# Patient Record
Sex: FEMALE | Race: WHITE
[De-identification: names, ages, dates, MRNs, and addresses within clinical notes are randomized per-mention and may not be internally consistent; named-entity substitution may affect disease eponyms.]

---

## 2019-01-21 ENCOUNTER — HOSPITAL ENCOUNTER (EMERGENCY)
Dept: HOSPITAL 94 - ER | Age: 54
Discharge: HOME | End: 2019-01-21
Payer: MEDICAID

## 2019-01-21 VITALS — DIASTOLIC BLOOD PRESSURE: 88 MMHG | SYSTOLIC BLOOD PRESSURE: 169 MMHG

## 2019-01-21 VITALS — HEIGHT: 68 IN | WEIGHT: 274.48 LBS | BODY MASS INDEX: 41.6 KG/M2

## 2019-01-21 DIAGNOSIS — K08.89: ICD-10-CM

## 2019-01-21 DIAGNOSIS — J44.9: ICD-10-CM

## 2019-01-21 DIAGNOSIS — F12.90: ICD-10-CM

## 2019-01-21 DIAGNOSIS — I20.8: Primary | ICD-10-CM

## 2019-01-21 DIAGNOSIS — Z88.0: ICD-10-CM

## 2019-01-21 DIAGNOSIS — Z79.82: ICD-10-CM

## 2019-01-21 DIAGNOSIS — Z98.890: ICD-10-CM

## 2019-01-21 DIAGNOSIS — E11.9: ICD-10-CM

## 2019-01-21 DIAGNOSIS — Z88.1: ICD-10-CM

## 2019-01-21 DIAGNOSIS — I10: ICD-10-CM

## 2019-01-21 DIAGNOSIS — F15.90: ICD-10-CM

## 2019-01-21 DIAGNOSIS — Z95.5: ICD-10-CM

## 2019-01-21 DIAGNOSIS — Z79.899: ICD-10-CM

## 2019-01-21 DIAGNOSIS — Z91.010: ICD-10-CM

## 2019-01-21 DIAGNOSIS — Z88.5: ICD-10-CM

## 2019-01-21 DIAGNOSIS — I25.2: ICD-10-CM

## 2019-01-21 LAB
ALBUMIN SERPL BCP-MCNC: 3.6 G/DL (ref 3.4–5)
ALBUMIN/GLOB SERPL: 0.9 {RATIO} (ref 1.1–1.5)
ALP SERPL-CCNC: 105 IU/L (ref 46–116)
ALT SERPL W P-5'-P-CCNC: 44 U/L (ref 12–78)
ANION GAP SERPL CALCULATED.3IONS-SCNC: 10 MMOL/L (ref 8–16)
APTT PPP: 27 SECONDS (ref 22–32)
AST SERPL W P-5'-P-CCNC: 29 U/L (ref 10–37)
BASOPHILS # BLD AUTO: 0 X10'3 (ref 0–0.2)
BASOPHILS NFR BLD AUTO: 0.3 % (ref 0–1)
BILIRUB SERPL-MCNC: 0.2 MG/DL (ref 0.1–1)
BUN SERPL-MCNC: 16 MG/DL (ref 7–18)
BUN/CREAT SERPL: 15.8 (ref 6.6–38)
CALCIUM SERPL-MCNC: 9.2 MG/DL (ref 8.5–10.1)
CHLORIDE SERPL-SCNC: 105 MMOL/L (ref 99–107)
CO2 SERPL-SCNC: 27.8 MMOL/L (ref 24–32)
CREAT SERPL-MCNC: 1.01 MG/DL (ref 0.4–0.9)
EOSINOPHIL # BLD AUTO: 0.3 X10'3 (ref 0–0.9)
EOSINOPHIL NFR BLD AUTO: 4 % (ref 0–6)
ERYTHROCYTE [DISTWIDTH] IN BLOOD BY AUTOMATED COUNT: 14.8 % (ref 11.5–14.5)
GFR SERPL CREATININE-BSD FRML MDRD: 57 ML/MIN
GLUCOSE SERPL-MCNC: 112 MG/DL (ref 70–104)
HCT VFR BLD AUTO: 42 % (ref 35–45)
HGB BLD-MCNC: 14 G/DL (ref 12–16)
INR PPP: 1 INR
LYMPHOCYTES # BLD AUTO: 2 X10'3 (ref 1.1–4.8)
LYMPHOCYTES NFR BLD AUTO: 27.5 % (ref 21–51)
MCH RBC QN AUTO: 28.8 PG (ref 27–31)
MCHC RBC AUTO-ENTMCNC: 33.3 % (ref 33–36.5)
MCV RBC AUTO: 86.4 FL (ref 78–98)
MONOCYTES # BLD AUTO: 0.5 X10'3 (ref 0–0.9)
MONOCYTES NFR BLD AUTO: 7.2 % (ref 2–12)
NEUTROPHILS # BLD AUTO: 4.5 X10'3 (ref 1.8–7.7)
NEUTROPHILS NFR BLD AUTO: 61 % (ref 42–75)
PLATELET # BLD AUTO: 200 X10'3 (ref 140–440)
PMV BLD AUTO: 8.8 FL (ref 7.4–10.4)
POTASSIUM SERPL-SCNC: 3.8 MMOL/L (ref 3.5–5.1)
PROT SERPL-MCNC: 7.7 G/DL (ref 6.4–8.2)
PROTHROMBIN TIME: 9.7 SECONDS (ref 9–12)
RBC # BLD AUTO: 4.86 X10'6 (ref 4.2–5.6)
SODIUM SERPL-SCNC: 143 MMOL/L (ref 135–145)
WBC # BLD AUTO: 7.3 X10'3 (ref 4.5–11)

## 2019-01-21 PROCEDURE — 85730 THROMBOPLASTIN TIME PARTIAL: CPT

## 2019-01-21 PROCEDURE — 80053 COMPREHEN METABOLIC PANEL: CPT

## 2019-01-21 PROCEDURE — 84484 ASSAY OF TROPONIN QUANT: CPT

## 2019-01-21 PROCEDURE — 96374 THER/PROPH/DIAG INJ IV PUSH: CPT

## 2019-01-21 PROCEDURE — 71045 X-RAY EXAM CHEST 1 VIEW: CPT

## 2019-01-21 PROCEDURE — 83880 ASSAY OF NATRIURETIC PEPTIDE: CPT

## 2019-01-21 PROCEDURE — 93005 ELECTROCARDIOGRAM TRACING: CPT

## 2019-01-21 PROCEDURE — 85610 PROTHROMBIN TIME: CPT

## 2019-01-21 PROCEDURE — 85025 COMPLETE CBC W/AUTO DIFF WBC: CPT

## 2019-01-21 PROCEDURE — 99284 EMERGENCY DEPT VISIT MOD MDM: CPT

## 2019-01-21 PROCEDURE — 36415 COLL VENOUS BLD VENIPUNCTURE: CPT

## 2019-02-23 ENCOUNTER — HOSPITAL ENCOUNTER (EMERGENCY)
Dept: HOSPITAL 94 - ER | Age: 54
Discharge: HOME | End: 2019-02-23
Payer: MEDICAID

## 2019-02-23 VITALS — DIASTOLIC BLOOD PRESSURE: 118 MMHG | SYSTOLIC BLOOD PRESSURE: 164 MMHG

## 2019-02-23 VITALS — WEIGHT: 271.17 LBS | HEIGHT: 68 IN | BODY MASS INDEX: 41.1 KG/M2

## 2019-02-23 DIAGNOSIS — K20.9: Primary | ICD-10-CM

## 2019-02-23 DIAGNOSIS — E11.9: ICD-10-CM

## 2019-02-23 DIAGNOSIS — Z88.0: ICD-10-CM

## 2019-02-23 DIAGNOSIS — Z95.1: ICD-10-CM

## 2019-02-23 DIAGNOSIS — Z88.6: ICD-10-CM

## 2019-02-23 DIAGNOSIS — Z91.010: ICD-10-CM

## 2019-02-23 DIAGNOSIS — Z88.1: ICD-10-CM

## 2019-02-23 DIAGNOSIS — J44.9: ICD-10-CM

## 2019-02-23 DIAGNOSIS — F12.90: ICD-10-CM

## 2019-02-23 DIAGNOSIS — I25.2: ICD-10-CM

## 2019-02-23 DIAGNOSIS — F15.90: ICD-10-CM

## 2019-02-23 DIAGNOSIS — Z88.8: ICD-10-CM

## 2019-02-23 DIAGNOSIS — I10: ICD-10-CM

## 2019-02-23 PROCEDURE — 93005 ELECTROCARDIOGRAM TRACING: CPT

## 2019-02-23 PROCEDURE — 71045 X-RAY EXAM CHEST 1 VIEW: CPT

## 2019-02-23 PROCEDURE — 99283 EMERGENCY DEPT VISIT LOW MDM: CPT

## 2019-04-14 ENCOUNTER — HOSPITAL ENCOUNTER (EMERGENCY)
Dept: HOSPITAL 94 - ER | Age: 54
Discharge: HOME | End: 2019-04-14
Payer: MEDICAID

## 2019-04-14 VITALS — WEIGHT: 220.46 LBS | BODY MASS INDEX: 35.43 KG/M2 | HEIGHT: 66 IN

## 2019-04-14 VITALS — SYSTOLIC BLOOD PRESSURE: 162 MMHG | DIASTOLIC BLOOD PRESSURE: 98 MMHG

## 2019-04-14 DIAGNOSIS — Z88.1: ICD-10-CM

## 2019-04-14 DIAGNOSIS — E11.9: ICD-10-CM
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
## 2019-04-14 DIAGNOSIS — S05.02XA: Primary | ICD-10-CM

## 2019-04-14 DIAGNOSIS — Z88.5: ICD-10-CM

## 2019-04-14 DIAGNOSIS — Z98.890: ICD-10-CM

## 2019-04-14 DIAGNOSIS — I25.2: ICD-10-CM

## 2019-04-14 DIAGNOSIS — J44.9: ICD-10-CM

## 2019-04-14 DIAGNOSIS — F12.90: ICD-10-CM

## 2019-04-14 DIAGNOSIS — F15.90: ICD-10-CM
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
## 2019-04-14 DIAGNOSIS — Z79.899: ICD-10-CM

## 2019-04-14 DIAGNOSIS — Z88.0: ICD-10-CM

## 2019-04-14 DIAGNOSIS — I10: ICD-10-CM

## 2019-04-14 DIAGNOSIS — Z91.018: ICD-10-CM
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
## 2019-04-14 DIAGNOSIS — Z91.010: ICD-10-CM

## 2019-04-14 DIAGNOSIS — Z98.61: ICD-10-CM

## 2019-04-14 PROCEDURE — 99283 EMERGENCY DEPT VISIT LOW MDM: CPT

## 2019-07-21 ENCOUNTER — HOSPITAL ENCOUNTER (EMERGENCY)
Dept: HOSPITAL 94 - ER | Age: 54
Discharge: HOME | End: 2019-07-21
Payer: MEDICAID

## 2019-07-21 VITALS — BODY MASS INDEX: 41.77 KG/M2 | HEIGHT: 68 IN | WEIGHT: 275.58 LBS

## 2019-07-21 VITALS — SYSTOLIC BLOOD PRESSURE: 180 MMHG | DIASTOLIC BLOOD PRESSURE: 75 MMHG

## 2019-07-21 DIAGNOSIS — I10: ICD-10-CM

## 2019-07-21 DIAGNOSIS — I25.2: ICD-10-CM

## 2019-07-21 DIAGNOSIS — Z88.1: ICD-10-CM

## 2019-07-21 DIAGNOSIS — Z79.899: ICD-10-CM

## 2019-07-21 DIAGNOSIS — J02.0: Primary | ICD-10-CM

## 2019-07-21 DIAGNOSIS — Z79.82: ICD-10-CM

## 2019-07-21 DIAGNOSIS — E11.9: ICD-10-CM

## 2019-07-21 DIAGNOSIS — Z98.890: ICD-10-CM

## 2019-07-21 DIAGNOSIS — Z86.73: ICD-10-CM

## 2019-07-21 DIAGNOSIS — Z88.0: ICD-10-CM

## 2019-07-21 DIAGNOSIS — R19.7: ICD-10-CM

## 2019-07-21 DIAGNOSIS — Z91.010: ICD-10-CM

## 2019-07-21 DIAGNOSIS — R07.89: ICD-10-CM

## 2019-07-21 DIAGNOSIS — Z98.61: ICD-10-CM

## 2019-07-21 DIAGNOSIS — F15.90: ICD-10-CM

## 2019-07-21 DIAGNOSIS — K00.7: ICD-10-CM

## 2019-07-21 DIAGNOSIS — Z88.5: ICD-10-CM

## 2019-07-21 DIAGNOSIS — B95.0: ICD-10-CM

## 2019-07-21 DIAGNOSIS — F12.90: ICD-10-CM

## 2019-07-21 DIAGNOSIS — Z91.018: ICD-10-CM

## 2019-07-21 DIAGNOSIS — J44.9: ICD-10-CM

## 2019-07-21 LAB
ALBUMIN SERPL BCP-MCNC: 3.4 G/DL (ref 3.4–5)
ALBUMIN/GLOB SERPL: 0.8 {RATIO} (ref 1.1–1.5)
ALP SERPL-CCNC: 109 IU/L (ref 46–116)
ALT SERPL W P-5'-P-CCNC: 37 U/L (ref 12–78)
AMPHETAMINES UR QL SCN: POSITIVE
ANION GAP SERPL CALCULATED.3IONS-SCNC: 11 MMOL/L (ref 8–16)
APTT PPP: 29 SECONDS (ref 22–32)
AST SERPL W P-5'-P-CCNC: 23 U/L (ref 10–37)
BACTERIA URNS QL MICRO: (no result) /HPF
BARBITURATES UR QL SCN: NEGATIVE
BASOPHILS # BLD AUTO: 0.1 X10'3 (ref 0–0.2)
BASOPHILS NFR BLD AUTO: 0.6 % (ref 0–1)
BENZODIAZ UR QL SCN: NEGATIVE
BILIRUB SERPL-MCNC: 0.5 MG/DL (ref 0.1–1)
BUN SERPL-MCNC: 18 MG/DL (ref 7–18)
BUN/CREAT SERPL: 19.8 (ref 6.6–38)
BZE UR QL SCN: NEGATIVE
CALCIUM SERPL-MCNC: 8.5 MG/DL (ref 8.5–10.1)
CANNABINOIDS UR QL SCN: NEGATIVE
CHLORIDE SERPL-SCNC: 101 MMOL/L (ref 99–107)
CLARITY UR: (no result)
CO2 SERPL-SCNC: 22.4 MMOL/L (ref 24–32)
COLOR UR: YELLOW
CREAT SERPL-MCNC: 0.91 MG/DL (ref 0.4–0.9)
DEPRECATED SQUAMOUS URNS QL MICRO: (no result) /LPF
EOSINOPHIL # BLD AUTO: 0.2 X10'3 (ref 0–0.9)
EOSINOPHIL NFR BLD AUTO: 1.4 % (ref 0–6)
ERYTHROCYTE [DISTWIDTH] IN BLOOD BY AUTOMATED COUNT: 14.8 % (ref 11.5–14.5)
GFR SERPL CREATININE-BSD FRML MDRD: 64 ML/MIN
GLUCOSE SERPL-MCNC: 125 MG/DL (ref 70–104)
GLUCOSE UR STRIP-MCNC: NEGATIVE MG/DL
HCT VFR BLD AUTO: 40.1 % (ref 35–45)
HGB BLD-MCNC: 13.4 G/DL (ref 12–16)
HGB UR QL STRIP: (no result)
KETONES UR STRIP-MCNC: NEGATIVE MG/DL
LEUKOCYTE ESTERASE UR QL STRIP: (no result)
LYMPHOCYTES # BLD AUTO: 2.1 X10'3 (ref 1.1–4.8)
LYMPHOCYTES NFR BLD AUTO: 16.2 % (ref 21–51)
MCH RBC QN AUTO: 29 PG (ref 27–31)
MCHC RBC AUTO-ENTMCNC: 33.5 G/DL (ref 33–36.5)
MCV RBC AUTO: 86.5 FL (ref 78–98)
METHADONE UR QL SCN: NEGATIVE
MONOCYTES # BLD AUTO: 0.9 X10'3 (ref 0–0.9)
MONOCYTES NFR BLD AUTO: 6.7 % (ref 2–12)
MUCOUS THREADS URNS QL MICRO: (no result) /LPF
NEUTROPHILS # BLD AUTO: 9.9 X10'3 (ref 1.8–7.7)
NEUTROPHILS NFR BLD AUTO: 75.1 % (ref 42–75)
NITRITE UR QL STRIP: NEGATIVE
OPIATES UR QL SCN: NEGATIVE
PCP UR QL SCN: NEGATIVE
PH UR STRIP: 6 [PH] (ref 4.8–8)
PLATELET # BLD AUTO: 192 X10'3 (ref 140–440)
PMV BLD AUTO: 8.8 FL (ref 7.4–10.4)
POTASSIUM SERPL-SCNC: 4 MMOL/L (ref 3.5–5.1)
PROT SERPL-MCNC: 7.7 G/DL (ref 6.4–8.2)
PROT UR QL STRIP: NEGATIVE MG/DL
RBC # BLD AUTO: 4.63 X10'6 (ref 4.2–5.6)
RBC #/AREA URNS HPF: (no result) /HPF (ref 0–2)
RENAL EPI CELLS URNS QL MICRO: (no result) /HPF
SODIUM SERPL-SCNC: 134 MMOL/L (ref 135–145)
SP GR UR STRIP: 1.01 (ref 1–1.03)
TRANS CELLS URNS QL MICRO: (no result) /HPF
URN COLLECT METHOD CLASS: (no result)
UROBILINOGEN UR STRIP-MCNC: 0.2 E.U/DL (ref 0.2–1)
WBC # BLD AUTO: 13.2 X10'3 (ref 4.5–11)
WBC CLUMPS #/AREA URNS HPF: (no result) /HPF

## 2019-07-21 PROCEDURE — 84484 ASSAY OF TROPONIN QUANT: CPT

## 2019-07-21 PROCEDURE — 99284 EMERGENCY DEPT VISIT MOD MDM: CPT

## 2019-07-21 PROCEDURE — 96361 HYDRATE IV INFUSION ADD-ON: CPT

## 2019-07-21 PROCEDURE — 87040 BLOOD CULTURE FOR BACTERIA: CPT

## 2019-07-21 PROCEDURE — 80053 COMPREHEN METABOLIC PANEL: CPT

## 2019-07-21 PROCEDURE — 87880 STREP A ASSAY W/OPTIC: CPT

## 2019-07-21 PROCEDURE — 85610 PROTHROMBIN TIME: CPT

## 2019-07-21 PROCEDURE — 71045 X-RAY EXAM CHEST 1 VIEW: CPT

## 2019-07-21 PROCEDURE — 80305 DRUG TEST PRSMV DIR OPT OBS: CPT

## 2019-07-21 PROCEDURE — 93005 ELECTROCARDIOGRAM TRACING: CPT

## 2019-07-21 PROCEDURE — 96367 TX/PROPH/DG ADDL SEQ IV INF: CPT

## 2019-07-21 PROCEDURE — 36415 COLL VENOUS BLD VENIPUNCTURE: CPT

## 2019-07-21 PROCEDURE — 81001 URINALYSIS AUTO W/SCOPE: CPT

## 2019-07-21 PROCEDURE — 84145 PROCALCITONIN (PCT): CPT

## 2019-07-21 PROCEDURE — 96365 THER/PROPH/DIAG IV INF INIT: CPT

## 2019-07-21 PROCEDURE — 85730 THROMBOPLASTIN TIME PARTIAL: CPT

## 2019-07-21 PROCEDURE — 87088 URINE BACTERIA CULTURE: CPT

## 2019-07-21 PROCEDURE — 85025 COMPLETE CBC W/AUTO DIFF WBC: CPT

## 2019-07-21 PROCEDURE — 83605 ASSAY OF LACTIC ACID: CPT

## 2019-11-02 ENCOUNTER — HOSPITAL ENCOUNTER (EMERGENCY)
Dept: HOSPITAL 94 - ER | Age: 54
Discharge: HOME | End: 2019-11-02
Payer: MEDICAID

## 2019-11-02 VITALS — DIASTOLIC BLOOD PRESSURE: 84 MMHG | SYSTOLIC BLOOD PRESSURE: 208 MMHG

## 2019-11-02 VITALS — BODY MASS INDEX: 34.75 KG/M2 | HEIGHT: 68 IN | WEIGHT: 229.28 LBS

## 2019-11-02 DIAGNOSIS — Z98.890: ICD-10-CM

## 2019-11-02 DIAGNOSIS — Z88.0: ICD-10-CM

## 2019-11-02 DIAGNOSIS — F12.90: ICD-10-CM

## 2019-11-02 DIAGNOSIS — Z86.73: ICD-10-CM

## 2019-11-02 DIAGNOSIS — I10: ICD-10-CM

## 2019-11-02 DIAGNOSIS — Z88.5: ICD-10-CM

## 2019-11-02 DIAGNOSIS — J44.9: ICD-10-CM

## 2019-11-02 DIAGNOSIS — R04.0: Primary | ICD-10-CM

## 2019-11-02 DIAGNOSIS — Z79.899: ICD-10-CM

## 2019-11-02 DIAGNOSIS — Z88.1: ICD-10-CM

## 2019-11-02 DIAGNOSIS — F15.90: ICD-10-CM

## 2019-11-02 DIAGNOSIS — I25.2: ICD-10-CM

## 2019-11-02 DIAGNOSIS — E11.9: ICD-10-CM

## 2019-11-02 DIAGNOSIS — Z91.018: ICD-10-CM

## 2019-11-02 DIAGNOSIS — Z95.818: ICD-10-CM

## 2019-11-02 PROCEDURE — 99284 EMERGENCY DEPT VISIT MOD MDM: CPT

## 2019-11-02 PROCEDURE — 96374 THER/PROPH/DIAG INJ IV PUSH: CPT

## 2019-12-30 ENCOUNTER — HOSPITAL ENCOUNTER (INPATIENT)
Dept: HOSPITAL 94 - ER | Age: 54
LOS: 1 days | Discharge: HOME | DRG: 113 | End: 2019-12-31
Attending: INTERNAL MEDICINE | Admitting: INTERNAL MEDICINE
Payer: MEDICAID

## 2019-12-30 VITALS — WEIGHT: 260.15 LBS | BODY MASS INDEX: 39.43 KG/M2 | HEIGHT: 68 IN

## 2019-12-30 VITALS — DIASTOLIC BLOOD PRESSURE: 88 MMHG | SYSTOLIC BLOOD PRESSURE: 148 MMHG

## 2019-12-30 DIAGNOSIS — Z91.018: ICD-10-CM

## 2019-12-30 DIAGNOSIS — F12.90: ICD-10-CM

## 2019-12-30 DIAGNOSIS — M54.5: ICD-10-CM

## 2019-12-30 DIAGNOSIS — Z88.5: ICD-10-CM

## 2019-12-30 DIAGNOSIS — Z91.010: ICD-10-CM

## 2019-12-30 DIAGNOSIS — Z88.8: ICD-10-CM

## 2019-12-30 DIAGNOSIS — J10.1: Primary | ICD-10-CM

## 2019-12-30 DIAGNOSIS — Z98.61: ICD-10-CM

## 2019-12-30 DIAGNOSIS — E11.9: ICD-10-CM

## 2019-12-30 DIAGNOSIS — I25.2: ICD-10-CM

## 2019-12-30 DIAGNOSIS — E66.01: ICD-10-CM

## 2019-12-30 DIAGNOSIS — G89.29: ICD-10-CM

## 2019-12-30 DIAGNOSIS — Z88.0: ICD-10-CM

## 2019-12-30 DIAGNOSIS — J44.1: ICD-10-CM

## 2019-12-30 DIAGNOSIS — Z86.73: ICD-10-CM

## 2019-12-30 DIAGNOSIS — I10: ICD-10-CM

## 2019-12-30 DIAGNOSIS — Z60.2: ICD-10-CM

## 2019-12-30 LAB
ALBUMIN SERPL BCP-MCNC: 3.7 G/DL (ref 3.4–5)
ALBUMIN/GLOB SERPL: 0.8 {RATIO} (ref 1.1–1.5)
ALP SERPL-CCNC: 106 IU/L (ref 46–116)
ALT SERPL W P-5'-P-CCNC: 65 U/L (ref 12–78)
ANION GAP SERPL CALCULATED.3IONS-SCNC: 10 MMOL/L (ref 8–16)
AST SERPL W P-5'-P-CCNC: 80 U/L (ref 10–37)
BACTERIA URNS QL MICRO: (no result) /HPF
BASOPHILS # BLD AUTO: 0 X10'3 (ref 0–0.2)
BASOPHILS NFR BLD AUTO: 0.4 % (ref 0–1)
BILIRUB SERPL-MCNC: 0.4 MG/DL (ref 0.1–1)
BUN SERPL-MCNC: 12 MG/DL (ref 7–18)
BUN/CREAT SERPL: 13.5 (ref 6.6–38)
CALCIUM SERPL-MCNC: 8.7 MG/DL (ref 8.5–10.1)
CHLORIDE SERPL-SCNC: 102 MMOL/L (ref 99–107)
CLARITY UR: (no result)
CO2 SERPL-SCNC: 26.3 MMOL/L (ref 24–32)
COLOR UR: (no result)
CREAT SERPL-MCNC: 0.89 MG/DL (ref 0.4–0.9)
DEPRECATED SQUAMOUS URNS QL MICRO: (no result) /LPF
EOSINOPHIL # BLD AUTO: 0.1 X10'3 (ref 0–0.9)
EOSINOPHIL NFR BLD AUTO: 0.7 % (ref 0–6)
ERYTHROCYTE [DISTWIDTH] IN BLOOD BY AUTOMATED COUNT: 14.6 % (ref 11.5–14.5)
GFR SERPL CREATININE-BSD FRML MDRD: 66 ML/MIN
GLUCOSE SERPL-MCNC: 131 MG/DL (ref 70–104)
GLUCOSE UR STRIP-MCNC: NEGATIVE MG/DL
HBA1C MFR BLD: 6.8 % (ref 4.5–6.2)
HCT VFR BLD AUTO: 41.4 % (ref 35–45)
HGB BLD-MCNC: 14 G/DL (ref 12–16)
HGB UR QL STRIP: (no result)
KETONES UR STRIP-MCNC: NEGATIVE MG/DL
LEUKOCYTE ESTERASE UR QL STRIP: NEGATIVE
LYMPHOCYTES # BLD AUTO: 0.3 X10'3 (ref 1.1–4.8)
LYMPHOCYTES NFR BLD AUTO: 4.3 % (ref 21–51)
MAGNESIUM SERPL-MCNC: 1.8 MG/DL (ref 1.5–2.4)
MCH RBC QN AUTO: 29.3 PG (ref 27–31)
MCHC RBC AUTO-ENTMCNC: 33.9 G/DL (ref 33–36.5)
MCV RBC AUTO: 86.3 FL (ref 78–98)
MONOCYTES # BLD AUTO: 0.8 X10'3 (ref 0–0.9)
MONOCYTES NFR BLD AUTO: 9.9 % (ref 2–12)
MUCOUS THREADS URNS QL MICRO: (no result) /LPF
NEUTROPHILS # BLD AUTO: 6.5 X10'3 (ref 1.8–7.7)
NEUTROPHILS NFR BLD AUTO: 84.7 % (ref 42–75)
NITRITE UR QL STRIP: NEGATIVE
PH UR STRIP: 6 [PH] (ref 4.8–8)
PLATELET # BLD AUTO: 189 X10'3 (ref 140–440)
PMV BLD AUTO: 8.7 FL (ref 7.4–10.4)
POTASSIUM SERPL-SCNC: 4 MMOL/L (ref 3.5–5.1)
PROT SERPL-MCNC: 8.6 G/DL (ref 6.4–8.2)
PROT UR QL STRIP: (no result) MG/DL
RBC # BLD AUTO: 4.8 X10'6 (ref 4.2–5.6)
RBC #/AREA URNS HPF: (no result) /HPF (ref 0–2)
SODIUM SERPL-SCNC: 138 MMOL/L (ref 135–145)
SP GR UR STRIP: 1.02 (ref 1–1.03)
TRANS CELLS URNS QL MICRO: (no result) /HPF
URN COLLECT METHOD CLASS: (no result)
UROBILINOGEN UR STRIP-MCNC: 0.2 E.U/DL (ref 0.2–1)
WBC # BLD AUTO: 7.7 X10'3 (ref 4.5–11)
WBC #/AREA URNS HPF: (no result) /HPF (ref 0–4)

## 2019-12-30 PROCEDURE — 83735 ASSAY OF MAGNESIUM: CPT

## 2019-12-30 PROCEDURE — 87503 INFLUENZA DNA AMP PROB ADDL: CPT

## 2019-12-30 PROCEDURE — 36415 COLL VENOUS BLD VENIPUNCTURE: CPT

## 2019-12-30 PROCEDURE — 80053 COMPREHEN METABOLIC PANEL: CPT

## 2019-12-30 PROCEDURE — 96374 THER/PROPH/DIAG INJ IV PUSH: CPT

## 2019-12-30 PROCEDURE — 84145 PROCALCITONIN (PCT): CPT

## 2019-12-30 PROCEDURE — 96375 TX/PRO/DX INJ NEW DRUG ADDON: CPT

## 2019-12-30 PROCEDURE — 85610 PROTHROMBIN TIME: CPT

## 2019-12-30 PROCEDURE — 87502 INFLUENZA DNA AMP PROBE: CPT

## 2019-12-30 PROCEDURE — 82948 REAGENT STRIP/BLOOD GLUCOSE: CPT

## 2019-12-30 PROCEDURE — 81001 URINALYSIS AUTO W/SCOPE: CPT

## 2019-12-30 PROCEDURE — 83605 ASSAY OF LACTIC ACID: CPT

## 2019-12-30 PROCEDURE — 94640 AIRWAY INHALATION TREATMENT: CPT

## 2019-12-30 PROCEDURE — 99285 EMERGENCY DEPT VISIT HI MDM: CPT

## 2019-12-30 PROCEDURE — 71045 X-RAY EXAM CHEST 1 VIEW: CPT

## 2019-12-30 PROCEDURE — 85025 COMPLETE CBC W/AUTO DIFF WBC: CPT

## 2019-12-30 PROCEDURE — 83036 HEMOGLOBIN GLYCOSYLATED A1C: CPT

## 2019-12-30 PROCEDURE — 80048 BASIC METABOLIC PNL TOTAL CA: CPT

## 2019-12-30 PROCEDURE — 87081 CULTURE SCREEN ONLY: CPT

## 2019-12-30 PROCEDURE — 87040 BLOOD CULTURE FOR BACTERIA: CPT

## 2019-12-30 RX ADMIN — SODIUM CHLORIDE SCH MLS/HR: 9 INJECTION INTRAMUSCULAR; INTRAVENOUS; SUBCUTANEOUS at 21:18

## 2019-12-30 RX ADMIN — INSULIN LISPRO SCH UNITS: 100 INJECTION, SOLUTION INTRAVENOUS; SUBCUTANEOUS at 22:31

## 2019-12-30 SDOH — SOCIAL STABILITY - SOCIAL INSECURITY: PROBLEMS RELATED TO LIVING ALONE: Z60.2

## 2019-12-30 NOTE — NUR
PT'S BLOOD SUGAR 305 PT STATES SHE HAS NEVER HAD INSULIN AND IS DIET CONTROLLED. MD AWARE OF 
BLOOD SUGARS  & 152 IN THE ER PRIOR TO OBTAINING THIS ONE.

## 2019-12-30 NOTE — NUR
pt medicated for blood sugar now of 271 via accucheck. received 12 of lantus and 1 unit of 
regular per ordered protocol.

## 2019-12-30 NOTE — NUR
pt transferred from [facility] into bed. vitals done nares swabbed and pt skin care done for inc 
of urine.

## 2019-12-30 NOTE — NUR
PT C/O ALL OVER ITCHING AFTER DECADRON WAS GIVEN, MD NOTIFIED, BENADRYL 
ORDERED. PT STATED ITCHING WAS GONE WHEN BENADRYL WAS AVAILABLE, REFUSED 
BENADRYL, STATED SHE DIDN'T NEED IT.

## 2019-12-30 NOTE — NUR
tray food ordered and given to pt stated she was hungry and had not eaten. made sure it was 
lower carbs.

## 2019-12-31 VITALS — DIASTOLIC BLOOD PRESSURE: 62 MMHG | SYSTOLIC BLOOD PRESSURE: 126 MMHG

## 2019-12-31 VITALS — SYSTOLIC BLOOD PRESSURE: 142 MMHG | DIASTOLIC BLOOD PRESSURE: 67 MMHG

## 2019-12-31 VITALS — SYSTOLIC BLOOD PRESSURE: 128 MMHG | DIASTOLIC BLOOD PRESSURE: 69 MMHG

## 2019-12-31 LAB
ALBUMIN SERPL BCP-MCNC: 3.1 G/DL (ref 3.4–5)
ANION GAP SERPL CALCULATED.3IONS-SCNC: 10 MMOL/L (ref 8–16)
BASOPHILS # BLD AUTO: 0 X10'3 (ref 0–0.2)
BASOPHILS NFR BLD AUTO: 0.6 % (ref 0–1)
BUN SERPL-MCNC: 18 MG/DL (ref 7–18)
BUN/CREAT SERPL: 20 (ref 6.6–38)
CALCIUM SERPL-MCNC: 8.5 MG/DL (ref 8.5–10.1)
CHLORIDE SERPL-SCNC: 107 MMOL/L (ref 99–107)
CO2 SERPL-SCNC: 23 MMOL/L (ref 24–32)
CREAT SERPL-MCNC: 0.9 MG/DL (ref 0.4–0.9)
EOSINOPHIL # BLD AUTO: 0 X10'3 (ref 0–0.9)
EOSINOPHIL NFR BLD AUTO: 0.1 % (ref 0–6)
ERYTHROCYTE [DISTWIDTH] IN BLOOD BY AUTOMATED COUNT: 15 % (ref 11.5–14.5)
GFR SERPL CREATININE-BSD FRML MDRD: 65 ML/MIN
GIANT PLATELETS BLD QL SMEAR: (no result)
GLUCOSE SERPL-MCNC: 170 MG/DL (ref 70–104)
HCT VFR BLD AUTO: 41.4 % (ref 35–45)
HGB BLD-MCNC: 13.8 G/DL (ref 12–16)
LYMPHOCYTES # BLD AUTO: 0.7 X10'3 (ref 1.1–4.8)
LYMPHOCYTES NFR BLD AUTO: 11.7 % (ref 21–51)
MAGNESIUM SERPL-MCNC: 1.9 MG/DL (ref 1.5–2.4)
MCH RBC QN AUTO: 29.1 PG (ref 27–31)
MCHC RBC AUTO-ENTMCNC: 33.3 G/DL (ref 33–36.5)
MCV RBC AUTO: 87.5 FL (ref 78–98)
MONOCYTES # BLD AUTO: 0.8 X10'3 (ref 0–0.9)
MONOCYTES NFR BLD AUTO: 13.9 % (ref 2–12)
NEUTROPHILS # BLD AUTO: 4.4 X10'3 (ref 1.8–7.7)
NEUTROPHILS NFR BLD AUTO: 73.7 % (ref 42–75)
PLATELET # BLD AUTO: 165 X10'3 (ref 140–440)
PLATELET BLD QL SMEAR: NORMAL
PMV BLD AUTO: 9.1 FL (ref 7.4–10.4)
POTASSIUM SERPL-SCNC: 4.1 MMOL/L (ref 3.5–5.1)
RBC # BLD AUTO: 4.73 X10'6 (ref 4.2–5.6)
SODIUM SERPL-SCNC: 140 MMOL/L (ref 135–145)
WBC # BLD AUTO: 6 X10'3 (ref 4.5–11)

## 2019-12-31 RX ADMIN — SODIUM CHLORIDE SCH MLS/HR: 9 INJECTION INTRAMUSCULAR; INTRAVENOUS; SUBCUTANEOUS at 13:29

## 2019-12-31 RX ADMIN — INSULIN LISPRO SCH UNITS: 100 INJECTION, SOLUTION INTRAVENOUS; SUBCUTANEOUS at 13:57

## 2019-12-31 RX ADMIN — INSULIN LISPRO SCH UNITS: 100 INJECTION, SOLUTION INTRAVENOUS; SUBCUTANEOUS at 09:27

## 2019-12-31 RX ADMIN — SODIUM CHLORIDE SCH MLS/HR: 9 INJECTION INTRAMUSCULAR; INTRAVENOUS; SUBCUTANEOUS at 03:29

## 2019-12-31 NOTE — NUR
Patient is alert, oriented, and appropriate for discharge. Patient verbalized understanding 
of all discharge instructions and symptom worsening. Patient IV and Tele removed. Patient 
has all belongings and left escorted by employee into personal vehicle. 

-------------------------------------------------------------------------------

Addendum: 12/31/19 at 1555 by Faith Weinberg RN

-------------------------------------------------------------------------------

Amended: Links added.

## 2019-12-31 NOTE — NUR
blood sugar checked bs 200. pt states she is feeling better.teaching done regarding her 
diabetes. whatv her a1c meant etc.

## 2019-12-31 NOTE — NUR
DM consult: Patient with hx T2DM with A1c 6.8 not warranting DM education however per 
consult pt requesting to speak with RD. Pt was discharged prior to RD being available for 
bedside visit. 


-------------------------------------------------------------------------------

Addendum: 12/31/19 at 1614 by Barbie Soni RD

-------------------------------------------------------------------------------

Amended: Links added.

## 2020-01-12 ENCOUNTER — HOSPITAL ENCOUNTER (EMERGENCY)
Dept: HOSPITAL 94 - ER | Age: 55
LOS: 1 days | Discharge: HOME | End: 2020-01-13
Payer: MEDICAID

## 2020-01-12 VITALS — BODY MASS INDEX: 39.76 KG/M2 | HEIGHT: 68 IN | WEIGHT: 262.35 LBS

## 2020-01-12 DIAGNOSIS — Z79.82: ICD-10-CM

## 2020-01-12 DIAGNOSIS — F15.90: ICD-10-CM

## 2020-01-12 DIAGNOSIS — Z88.8: ICD-10-CM

## 2020-01-12 DIAGNOSIS — F12.90: ICD-10-CM

## 2020-01-12 DIAGNOSIS — Z86.73: ICD-10-CM

## 2020-01-12 DIAGNOSIS — Z88.1: ICD-10-CM

## 2020-01-12 DIAGNOSIS — Z91.010: ICD-10-CM

## 2020-01-12 DIAGNOSIS — I10: ICD-10-CM

## 2020-01-12 DIAGNOSIS — Z79.2: ICD-10-CM

## 2020-01-12 DIAGNOSIS — Z79.899: ICD-10-CM

## 2020-01-12 DIAGNOSIS — Z88.5: ICD-10-CM

## 2020-01-12 DIAGNOSIS — J44.1: Primary | ICD-10-CM

## 2020-01-12 DIAGNOSIS — I25.2: ICD-10-CM

## 2020-01-12 DIAGNOSIS — Z98.890: ICD-10-CM

## 2020-01-12 DIAGNOSIS — Z95.5: ICD-10-CM

## 2020-01-12 DIAGNOSIS — E11.9: ICD-10-CM

## 2020-01-12 LAB
ALBUMIN SERPL BCP-MCNC: 3.5 G/DL (ref 3.4–5)
ALBUMIN/GLOB SERPL: 0.9 {RATIO} (ref 1.1–1.5)
ALP SERPL-CCNC: 95 IU/L (ref 46–116)
ALT SERPL W P-5'-P-CCNC: 52 U/L (ref 12–78)
ANION GAP SERPL CALCULATED.3IONS-SCNC: 10 MMOL/L (ref 8–16)
AST SERPL W P-5'-P-CCNC: 47 U/L (ref 10–37)
BASOPHILS # BLD AUTO: 0.1 X10'3 (ref 0–0.2)
BASOPHILS NFR BLD AUTO: 1.5 % (ref 0–1)
BILIRUB SERPL-MCNC: 0.3 MG/DL (ref 0.1–1)
BUN SERPL-MCNC: 15 MG/DL (ref 7–18)
BUN/CREAT SERPL: 14.9 (ref 6.6–38)
CALCIUM SERPL-MCNC: 8.7 MG/DL (ref 8.5–10.1)
CHLORIDE SERPL-SCNC: 109 MMOL/L (ref 99–107)
CO2 SERPL-SCNC: 24.9 MMOL/L (ref 24–32)
CREAT SERPL-MCNC: 1.01 MG/DL (ref 0.4–0.9)
EOSINOPHIL # BLD AUTO: 0.2 X10'3 (ref 0–0.9)
EOSINOPHIL NFR BLD AUTO: 2 % (ref 0–6)
ERYTHROCYTE [DISTWIDTH] IN BLOOD BY AUTOMATED COUNT: 14.8 % (ref 11.5–14.5)
GFR SERPL CREATININE-BSD FRML MDRD: 57 ML/MIN
GLUCOSE SERPL-MCNC: 97 MG/DL (ref 70–104)
HCT VFR BLD AUTO: 36.8 % (ref 35–45)
HGB BLD-MCNC: 12.3 G/DL (ref 12–16)
LYMPHOCYTES # BLD AUTO: 2.7 X10'3 (ref 1.1–4.8)
LYMPHOCYTES NFR BLD AUTO: 30.4 % (ref 21–51)
MCH RBC QN AUTO: 28.8 PG (ref 27–31)
MCHC RBC AUTO-ENTMCNC: 33.4 G/DL (ref 33–36.5)
MCV RBC AUTO: 86.4 FL (ref 78–98)
MONOCYTES # BLD AUTO: 0.8 X10'3 (ref 0–0.9)
MONOCYTES NFR BLD AUTO: 9 % (ref 2–12)
NEUTROPHILS # BLD AUTO: 5 X10'3 (ref 1.8–7.7)
NEUTROPHILS NFR BLD AUTO: 57.1 % (ref 42–75)
PLATELET # BLD AUTO: 211 X10'3 (ref 140–440)
PMV BLD AUTO: 8.9 FL (ref 7.4–10.4)
POTASSIUM SERPL-SCNC: 3.7 MMOL/L (ref 3.5–5.1)
PROT SERPL-MCNC: 7.3 G/DL (ref 6.4–8.2)
RBC # BLD AUTO: 4.26 X10'6 (ref 4.2–5.6)
SODIUM SERPL-SCNC: 144 MMOL/L (ref 135–145)
WBC # BLD AUTO: 8.8 X10'3 (ref 4.5–11)

## 2020-01-12 PROCEDURE — 93005 ELECTROCARDIOGRAM TRACING: CPT

## 2020-01-12 PROCEDURE — 80053 COMPREHEN METABOLIC PANEL: CPT

## 2020-01-12 PROCEDURE — 71045 X-RAY EXAM CHEST 1 VIEW: CPT

## 2020-01-12 PROCEDURE — 85025 COMPLETE CBC W/AUTO DIFF WBC: CPT

## 2020-01-12 PROCEDURE — 94760 N-INVAS EAR/PLS OXIMETRY 1: CPT

## 2020-01-12 PROCEDURE — 36415 COLL VENOUS BLD VENIPUNCTURE: CPT

## 2020-01-12 PROCEDURE — 94640 AIRWAY INHALATION TREATMENT: CPT

## 2020-01-12 PROCEDURE — 99284 EMERGENCY DEPT VISIT MOD MDM: CPT

## 2020-01-12 NOTE — NUR
PT CONCERNED POSSIBLE BLOOD CLOT IN RIGHT LEG.  SHE HAS HAD 2 IN LEFT LEG AND 
SAYS THATS WHAT IT FEELS LIKE

## 2020-01-13 VITALS — SYSTOLIC BLOOD PRESSURE: 153 MMHG | DIASTOLIC BLOOD PRESSURE: 77 MMHG

## 2020-05-28 ENCOUNTER — HOSPITAL ENCOUNTER (INPATIENT)
Dept: HOSPITAL 94 - ER | Age: 55
LOS: 2 days | Discharge: HOME | DRG: 198 | End: 2020-05-30
Attending: INTERNAL MEDICINE | Admitting: INTERNAL MEDICINE
Payer: MEDICAID

## 2020-05-28 VITALS — DIASTOLIC BLOOD PRESSURE: 88 MMHG | SYSTOLIC BLOOD PRESSURE: 171 MMHG

## 2020-05-28 VITALS — BODY MASS INDEX: 38.12 KG/M2 | WEIGHT: 251.53 LBS | HEIGHT: 68 IN

## 2020-05-28 VITALS — DIASTOLIC BLOOD PRESSURE: 64 MMHG | SYSTOLIC BLOOD PRESSURE: 145 MMHG

## 2020-05-28 DIAGNOSIS — Z79.899: ICD-10-CM

## 2020-05-28 DIAGNOSIS — Z88.0: ICD-10-CM

## 2020-05-28 DIAGNOSIS — Z88.5: ICD-10-CM

## 2020-05-28 DIAGNOSIS — F12.90: ICD-10-CM

## 2020-05-28 DIAGNOSIS — K21.9: ICD-10-CM

## 2020-05-28 DIAGNOSIS — I25.2: ICD-10-CM

## 2020-05-28 DIAGNOSIS — R07.89: Primary | ICD-10-CM

## 2020-05-28 DIAGNOSIS — J44.9: ICD-10-CM

## 2020-05-28 DIAGNOSIS — Z95.5: ICD-10-CM

## 2020-05-28 DIAGNOSIS — Z90.49: ICD-10-CM

## 2020-05-28 DIAGNOSIS — Z86.73: ICD-10-CM

## 2020-05-28 DIAGNOSIS — Z79.51: ICD-10-CM

## 2020-05-28 DIAGNOSIS — I25.10: ICD-10-CM

## 2020-05-28 DIAGNOSIS — M54.5: ICD-10-CM

## 2020-05-28 DIAGNOSIS — G89.29: ICD-10-CM

## 2020-05-28 DIAGNOSIS — Z88.8: ICD-10-CM

## 2020-05-28 DIAGNOSIS — E11.9: ICD-10-CM

## 2020-05-28 DIAGNOSIS — Z23: ICD-10-CM

## 2020-05-28 DIAGNOSIS — I10: ICD-10-CM

## 2020-05-28 LAB
ALBUMIN SERPL BCP-MCNC: 3.6 G/DL (ref 3.4–5)
ALBUMIN/GLOB SERPL: 0.8 {RATIO} (ref 1.1–1.5)
ALP SERPL-CCNC: 97 IU/L (ref 46–116)
ALT SERPL W P-5'-P-CCNC: 42 U/L (ref 12–78)
ANION GAP SERPL CALCULATED.3IONS-SCNC: 11 MMOL/L (ref 8–16)
AST SERPL W P-5'-P-CCNC: 38 U/L (ref 10–37)
BASOPHILS # BLD AUTO: 0.1 X10'3 (ref 0–0.2)
BASOPHILS NFR BLD AUTO: 0.7 % (ref 0–1)
BILIRUB SERPL-MCNC: 0.5 MG/DL (ref 0.1–1)
BUN SERPL-MCNC: 18 MG/DL (ref 7–18)
BUN/CREAT SERPL: 18 (ref 6.6–38)
CALCIUM SERPL-MCNC: 9.8 MG/DL (ref 8.5–10.1)
CHLORIDE SERPL-SCNC: 105 MMOL/L (ref 99–107)
CO2 SERPL-SCNC: 27.1 MMOL/L (ref 24–32)
CREAT SERPL-MCNC: 1 MG/DL (ref 0.4–0.9)
EOSINOPHIL # BLD AUTO: 0.2 X10'3 (ref 0–0.9)
EOSINOPHIL NFR BLD AUTO: 2.1 % (ref 0–6)
ERYTHROCYTE [DISTWIDTH] IN BLOOD BY AUTOMATED COUNT: 14.4 % (ref 11.5–14.5)
GFR SERPL CREATININE-BSD FRML MDRD: 57 ML/MIN
GLUCOSE SERPL-MCNC: 152 MG/DL (ref 70–104)
HBA1C MFR BLD: 6.4 % (ref 4.5–6.2)
HCT VFR BLD AUTO: 45.1 % (ref 35–45)
HGB BLD-MCNC: 15 G/DL (ref 12–16)
LYMPHOCYTES # BLD AUTO: 2.3 X10'3 (ref 1.1–4.8)
LYMPHOCYTES NFR BLD AUTO: 27.8 % (ref 21–51)
MAGNESIUM SERPL-MCNC: 2 MG/DL (ref 1.5–2.4)
MCH RBC QN AUTO: 28.9 PG (ref 27–31)
MCHC RBC AUTO-ENTMCNC: 33.2 G/DL (ref 33–36.5)
MCV RBC AUTO: 87.1 FL (ref 78–98)
MONOCYTES # BLD AUTO: 0.6 X10'3 (ref 0–0.9)
MONOCYTES NFR BLD AUTO: 7.4 % (ref 2–12)
NEUTROPHILS # BLD AUTO: 5.2 X10'3 (ref 1.8–7.7)
NEUTROPHILS NFR BLD AUTO: 62 % (ref 42–75)
PLATELET # BLD AUTO: 217 X10'3 (ref 140–440)
PMV BLD AUTO: 9 FL (ref 7.4–10.4)
POTASSIUM SERPL-SCNC: 4.3 MMOL/L (ref 3.5–5.1)
PROT SERPL-MCNC: 7.9 G/DL (ref 6.4–8.2)
RBC # BLD AUTO: 5.18 X10'6 (ref 4.2–5.6)
SODIUM SERPL-SCNC: 143 MMOL/L (ref 135–145)
WBC # BLD AUTO: 8.4 X10'3 (ref 4.5–11)

## 2020-05-28 PROCEDURE — 82948 REAGENT STRIP/BLOOD GLUCOSE: CPT

## 2020-05-28 PROCEDURE — 99285 EMERGENCY DEPT VISIT HI MDM: CPT

## 2020-05-28 PROCEDURE — 93017 CV STRESS TEST TRACING ONLY: CPT

## 2020-05-28 PROCEDURE — 80048 BASIC METABOLIC PNL TOTAL CA: CPT

## 2020-05-28 PROCEDURE — 85025 COMPLETE CBC W/AUTO DIFF WBC: CPT

## 2020-05-28 PROCEDURE — 83036 HEMOGLOBIN GLYCOSYLATED A1C: CPT

## 2020-05-28 PROCEDURE — 93306 TTE W/DOPPLER COMPLETE: CPT

## 2020-05-28 PROCEDURE — 87081 CULTURE SCREEN ONLY: CPT

## 2020-05-28 PROCEDURE — 36415 COLL VENOUS BLD VENIPUNCTURE: CPT

## 2020-05-28 PROCEDURE — 71045 X-RAY EXAM CHEST 1 VIEW: CPT

## 2020-05-28 PROCEDURE — 84484 ASSAY OF TROPONIN QUANT: CPT

## 2020-05-28 PROCEDURE — 83735 ASSAY OF MAGNESIUM: CPT

## 2020-05-28 PROCEDURE — 78452 HT MUSCLE IMAGE SPECT MULT: CPT

## 2020-05-28 PROCEDURE — 90732 PPSV23 VACC 2 YRS+ SUBQ/IM: CPT

## 2020-05-28 PROCEDURE — 93005 ELECTROCARDIOGRAM TRACING: CPT

## 2020-05-28 PROCEDURE — 3E0234Z INTRODUCTION OF SERUM, TOXOID AND VACCINE INTO MUSCLE, PERCUTANEOUS APPROACH: ICD-10-PCS | Performed by: RADIOLOGY

## 2020-05-28 PROCEDURE — 80053 COMPREHEN METABOLIC PANEL: CPT

## 2020-05-28 NOTE — NUR
PAGER ID:  [phone] 

MESSAGE:  Dr. Behl do you want to order magnesium level for edward knowles she has bigeminal 
pvc's. potassium is okay. Caron [phone]

## 2020-05-28 NOTE — NUR
Patient in room ED 5. I have received report from Kathryn CERVANTES  and had the opportunity to ask 
questions and assume patient care.

## 2020-05-29 VITALS — SYSTOLIC BLOOD PRESSURE: 157 MMHG | DIASTOLIC BLOOD PRESSURE: 66 MMHG

## 2020-05-29 VITALS — DIASTOLIC BLOOD PRESSURE: 88 MMHG | SYSTOLIC BLOOD PRESSURE: 164 MMHG

## 2020-05-29 VITALS — SYSTOLIC BLOOD PRESSURE: 172 MMHG | DIASTOLIC BLOOD PRESSURE: 82 MMHG

## 2020-05-29 VITALS — SYSTOLIC BLOOD PRESSURE: 146 MMHG | DIASTOLIC BLOOD PRESSURE: 82 MMHG

## 2020-05-29 LAB
ALBUMIN SERPL BCP-MCNC: 3.2 G/DL (ref 3.4–5)
ANION GAP SERPL CALCULATED.3IONS-SCNC: 12 MMOL/L (ref 8–16)
BASOPHILS # BLD AUTO: 0.1 X10'3 (ref 0–0.2)
BASOPHILS NFR BLD AUTO: 0.9 % (ref 0–1)
BUN SERPL-MCNC: 18 MG/DL (ref 7–18)
BUN/CREAT SERPL: 21.4 (ref 6.6–38)
CALCIUM SERPL-MCNC: 9.1 MG/DL (ref 8.5–10.1)
CHLORIDE SERPL-SCNC: 103 MMOL/L (ref 99–107)
CO2 SERPL-SCNC: 22.1 MMOL/L (ref 24–32)
CREAT SERPL-MCNC: 0.84 MG/DL (ref 0.4–0.9)
EOSINOPHIL # BLD AUTO: 0.2 X10'3 (ref 0–0.9)
EOSINOPHIL NFR BLD AUTO: 2.8 % (ref 0–6)
ERYTHROCYTE [DISTWIDTH] IN BLOOD BY AUTOMATED COUNT: 14.2 % (ref 11.5–14.5)
GFR SERPL CREATININE-BSD FRML MDRD: 70 ML/MIN
GLUCOSE SERPL-MCNC: 121 MG/DL (ref 70–104)
HCT VFR BLD AUTO: 45.4 % (ref 35–45)
HGB BLD-MCNC: 15.1 G/DL (ref 12–16)
LYMPHOCYTES # BLD AUTO: 2.8 X10'3 (ref 1.1–4.8)
LYMPHOCYTES NFR BLD AUTO: 32.8 % (ref 21–51)
MCH RBC QN AUTO: 29.4 PG (ref 27–31)
MCHC RBC AUTO-ENTMCNC: 33.3 G/DL (ref 33–36.5)
MCV RBC AUTO: 88.3 FL (ref 78–98)
MONOCYTES # BLD AUTO: 0.6 X10'3 (ref 0–0.9)
MONOCYTES NFR BLD AUTO: 6.5 % (ref 2–12)
NEUTROPHILS # BLD AUTO: 4.8 X10'3 (ref 1.8–7.7)
NEUTROPHILS NFR BLD AUTO: 57 % (ref 42–75)
PLATELET # BLD AUTO: 170 X10'3 (ref 140–440)
PMV BLD AUTO: 9.9 FL (ref 7.4–10.4)
POTASSIUM SERPL-SCNC: 4.4 MMOL/L (ref 3.5–5.1)
RBC # BLD AUTO: 5.14 X10'6 (ref 4.2–5.6)
SODIUM SERPL-SCNC: 137 MMOL/L (ref 135–145)
WBC # BLD AUTO: 8.5 X10'3 (ref 4.5–11)

## 2020-05-29 RX ADMIN — Medication SCH MG: at 11:33

## 2020-05-29 NOTE — NUR
Problems reprioritized. Patient report given, questions answered & plan of care reviewed 
with Aye MARINELLI RN.

## 2020-05-29 NOTE — NUR
Problems reprioritized. Patient report given, questions answered & plan of care reviewed 
with HELEN Nye.

## 2020-05-29 NOTE — NUR
DM consult: Pt with A1c 6.4%, DM education not warranted at this time. Will continue to 
follow.


-------------------------------------------------------------------------------

Addendum: 05/29/20 at 0912 by Barbie Soni RD

-------------------------------------------------------------------------------

Amended: Links added.

## 2020-05-30 VITALS — SYSTOLIC BLOOD PRESSURE: 210 MMHG | DIASTOLIC BLOOD PRESSURE: 81 MMHG

## 2020-05-30 VITALS — SYSTOLIC BLOOD PRESSURE: 176 MMHG | DIASTOLIC BLOOD PRESSURE: 91 MMHG

## 2020-05-30 VITALS — SYSTOLIC BLOOD PRESSURE: 206 MMHG | DIASTOLIC BLOOD PRESSURE: 84 MMHG

## 2020-05-30 VITALS — DIASTOLIC BLOOD PRESSURE: 93 MMHG | SYSTOLIC BLOOD PRESSURE: 217 MMHG

## 2020-05-30 VITALS — SYSTOLIC BLOOD PRESSURE: 172 MMHG | DIASTOLIC BLOOD PRESSURE: 95 MMHG

## 2020-05-30 VITALS — SYSTOLIC BLOOD PRESSURE: 214 MMHG | DIASTOLIC BLOOD PRESSURE: 115 MMHG

## 2020-05-30 VITALS — SYSTOLIC BLOOD PRESSURE: 202 MMHG | DIASTOLIC BLOOD PRESSURE: 101 MMHG

## 2020-05-30 VITALS — SYSTOLIC BLOOD PRESSURE: 178 MMHG | DIASTOLIC BLOOD PRESSURE: 94 MMHG

## 2020-05-30 VITALS — DIASTOLIC BLOOD PRESSURE: 88 MMHG | SYSTOLIC BLOOD PRESSURE: 204 MMHG

## 2020-05-30 VITALS — DIASTOLIC BLOOD PRESSURE: 89 MMHG | SYSTOLIC BLOOD PRESSURE: 170 MMHG

## 2020-05-30 VITALS — SYSTOLIC BLOOD PRESSURE: 183 MMHG | DIASTOLIC BLOOD PRESSURE: 94 MMHG

## 2020-05-30 VITALS — SYSTOLIC BLOOD PRESSURE: 167 MMHG | DIASTOLIC BLOOD PRESSURE: 88 MMHG

## 2020-05-30 LAB
ALBUMIN SERPL BCP-MCNC: 3.2 G/DL (ref 3.4–5)
ANION GAP SERPL CALCULATED.3IONS-SCNC: 9 MMOL/L (ref 8–16)
BASOPHILS # BLD AUTO: 0 X10'3 (ref 0–0.2)
BASOPHILS NFR BLD AUTO: 0.4 % (ref 0–1)
BUN SERPL-MCNC: 18 MG/DL (ref 7–18)
BUN/CREAT SERPL: 21.7 (ref 6.6–38)
CALCIUM SERPL-MCNC: 8.8 MG/DL (ref 8.5–10.1)
CHLORIDE SERPL-SCNC: 104 MMOL/L (ref 99–107)
CO2 SERPL-SCNC: 24.4 MMOL/L (ref 24–32)
CREAT SERPL-MCNC: 0.83 MG/DL (ref 0.4–0.9)
EOSINOPHIL # BLD AUTO: 0.2 X10'3 (ref 0–0.9)
EOSINOPHIL NFR BLD AUTO: 2.5 % (ref 0–6)
ERYTHROCYTE [DISTWIDTH] IN BLOOD BY AUTOMATED COUNT: 13.8 % (ref 11.5–14.5)
GFR SERPL CREATININE-BSD FRML MDRD: 71 ML/MIN
GLUCOSE SERPL-MCNC: 122 MG/DL (ref 70–104)
HCT VFR BLD AUTO: 41 % (ref 35–45)
HGB BLD-MCNC: 14 G/DL (ref 12–16)
LYMPHOCYTES # BLD AUTO: 2.6 X10'3 (ref 1.1–4.8)
LYMPHOCYTES NFR BLD AUTO: 35.4 % (ref 21–51)
MCH RBC QN AUTO: 29.8 PG (ref 27–31)
MCHC RBC AUTO-ENTMCNC: 34.1 G/DL (ref 33–36.5)
MCV RBC AUTO: 87.3 FL (ref 78–98)
MONOCYTES # BLD AUTO: 0.5 X10'3 (ref 0–0.9)
MONOCYTES NFR BLD AUTO: 6.9 % (ref 2–12)
NEUTROPHILS # BLD AUTO: 4 X10'3 (ref 1.8–7.7)
NEUTROPHILS NFR BLD AUTO: 54.8 % (ref 42–75)
PLATELET # BLD AUTO: 180 X10'3 (ref 140–440)
PMV BLD AUTO: 9.2 FL (ref 7.4–10.4)
POTASSIUM SERPL-SCNC: 3.9 MMOL/L (ref 3.5–5.1)
RBC # BLD AUTO: 4.7 X10'6 (ref 4.2–5.6)
SODIUM SERPL-SCNC: 137 MMOL/L (ref 135–145)
WBC # BLD AUTO: 7.3 X10'3 (ref 4.5–11)

## 2020-05-30 PROCEDURE — 4A02XM4 MEASUREMENT OF CARDIAC TOTAL ACTIVITY, EXTERNAL APPROACH: ICD-10-PCS | Performed by: RADIOLOGY

## 2020-05-30 PROCEDURE — 3E073KZ INTRODUCTION OF OTHER DIAGNOSTIC SUBSTANCE INTO CORONARY ARTERY, PERCUTANEOUS APPROACH: ICD-10-PCS | Performed by: RADIOLOGY

## 2020-05-30 RX ADMIN — Medication SCH MG: at 11:50

## 2020-05-30 NOTE — NUR
PAGER ID:  [phone] 

MESSAGE:  ZAIRE [phone] RE: RUBIO [id number] ANGEL NEGATIVE, DC MEDS NEED TO BE FINISHED.

## 2020-05-30 NOTE — NUR
[facility] CONCERNED ABOUT PERFORMING ANGEL SCAN. WHEN TALKING WITH PATIENT SHE STATED LAST 
TIME SHE TRIED TO HAVE A ANGEL HER HR DROPPED TO THE 40'S. MD NOTIFIED AND WANTS TO TRY TO 
PERFORM THE TEST.

## 2020-05-30 NOTE — NUR
Patient in room ORTHO 4013. I have received report from  ANGY CERVANTES  and had the opportunity 
to ask questions and assume patient care.

## 2020-05-30 NOTE — NUR
Problems reprioritized. Patient report given, questions answered & plan of care reviewed 
with HELEN Swartz.

## 2020-06-29 ENCOUNTER — HOSPITAL ENCOUNTER (EMERGENCY)
Dept: HOSPITAL 94 - ER | Age: 55
Discharge: HOME | End: 2020-06-29
Payer: MEDICAID

## 2020-06-29 VITALS — SYSTOLIC BLOOD PRESSURE: 159 MMHG | DIASTOLIC BLOOD PRESSURE: 99 MMHG

## 2020-06-29 VITALS — WEIGHT: 249.56 LBS | BODY MASS INDEX: 37.82 KG/M2 | HEIGHT: 68 IN

## 2020-06-29 DIAGNOSIS — G89.29: ICD-10-CM

## 2020-06-29 DIAGNOSIS — Z88.1: ICD-10-CM

## 2020-06-29 DIAGNOSIS — Z79.82: ICD-10-CM

## 2020-06-29 DIAGNOSIS — I10: ICD-10-CM

## 2020-06-29 DIAGNOSIS — Z98.61: ICD-10-CM

## 2020-06-29 DIAGNOSIS — I25.2: ICD-10-CM

## 2020-06-29 DIAGNOSIS — Z88.0: ICD-10-CM

## 2020-06-29 DIAGNOSIS — M25.572: Primary | ICD-10-CM

## 2020-06-29 DIAGNOSIS — F15.90: ICD-10-CM

## 2020-06-29 DIAGNOSIS — Z98.890: ICD-10-CM

## 2020-06-29 DIAGNOSIS — Z88.5: ICD-10-CM

## 2020-06-29 DIAGNOSIS — Z79.899: ICD-10-CM

## 2020-06-29 DIAGNOSIS — F12.90: ICD-10-CM

## 2020-06-29 DIAGNOSIS — J44.9: ICD-10-CM

## 2020-06-29 DIAGNOSIS — E11.9: ICD-10-CM

## 2020-06-29 DIAGNOSIS — Z91.010: ICD-10-CM

## 2020-06-29 DIAGNOSIS — Z86.73: ICD-10-CM

## 2020-06-29 PROCEDURE — 73610 X-RAY EXAM OF ANKLE: CPT

## 2020-06-29 PROCEDURE — 99283 EMERGENCY DEPT VISIT LOW MDM: CPT

## 2020-07-02 ENCOUNTER — HOSPITAL ENCOUNTER (EMERGENCY)
Dept: HOSPITAL 94 - ER | Age: 55
Discharge: HOME | End: 2020-07-02
Payer: MEDICAID

## 2020-07-02 VITALS — HEIGHT: 68 IN | BODY MASS INDEX: 38.01 KG/M2 | WEIGHT: 250.78 LBS

## 2020-07-02 VITALS — SYSTOLIC BLOOD PRESSURE: 172 MMHG | DIASTOLIC BLOOD PRESSURE: 88 MMHG

## 2020-07-02 DIAGNOSIS — Z79.82: ICD-10-CM

## 2020-07-02 DIAGNOSIS — F15.90: ICD-10-CM

## 2020-07-02 DIAGNOSIS — J45.909: ICD-10-CM

## 2020-07-02 DIAGNOSIS — I25.2: ICD-10-CM

## 2020-07-02 DIAGNOSIS — Z79.2: ICD-10-CM

## 2020-07-02 DIAGNOSIS — Z79.899: ICD-10-CM

## 2020-07-02 DIAGNOSIS — E11.9: ICD-10-CM

## 2020-07-02 DIAGNOSIS — Z88.1: ICD-10-CM

## 2020-07-02 DIAGNOSIS — Z88.5: ICD-10-CM

## 2020-07-02 DIAGNOSIS — G89.29: ICD-10-CM

## 2020-07-02 DIAGNOSIS — I10: ICD-10-CM

## 2020-07-02 DIAGNOSIS — Z91.018: ICD-10-CM

## 2020-07-02 DIAGNOSIS — Z98.890: ICD-10-CM

## 2020-07-02 DIAGNOSIS — Z86.73: ICD-10-CM

## 2020-07-02 DIAGNOSIS — R05: ICD-10-CM

## 2020-07-02 DIAGNOSIS — J44.9: ICD-10-CM

## 2020-07-02 DIAGNOSIS — R07.9: Primary | ICD-10-CM

## 2020-07-02 DIAGNOSIS — F12.90: ICD-10-CM

## 2020-07-02 DIAGNOSIS — Z88.0: ICD-10-CM

## 2020-07-02 LAB
ALBUMIN SERPL BCP-MCNC: 3.7 G/DL (ref 3.4–5)
ALBUMIN/GLOB SERPL: 0.9 {RATIO} (ref 1.1–1.5)
ALP SERPL-CCNC: 102 IU/L (ref 46–116)
ALT SERPL W P-5'-P-CCNC: 44 U/L (ref 12–78)
ANION GAP SERPL CALCULATED.3IONS-SCNC: 7 MMOL/L (ref 8–16)
AST SERPL W P-5'-P-CCNC: 25 U/L (ref 10–37)
BASOPHILS # BLD AUTO: 0.1 X10'3 (ref 0–0.2)
BASOPHILS NFR BLD AUTO: 0.8 % (ref 0–1)
BILIRUB SERPL-MCNC: 0.3 MG/DL (ref 0.1–1)
BUN SERPL-MCNC: 15 MG/DL (ref 7–18)
BUN/CREAT SERPL: 14.4 (ref 6.6–38)
CALCIUM SERPL-MCNC: 9 MG/DL (ref 8.5–10.1)
CHLORIDE SERPL-SCNC: 108 MMOL/L (ref 99–107)
CO2 SERPL-SCNC: 27.7 MMOL/L (ref 24–32)
CREAT SERPL-MCNC: 1.04 MG/DL (ref 0.4–0.9)
D DIMER PPP FEU-MCNC: 0.35 MG/L FEU (ref 0–0.5)
EOSINOPHIL # BLD AUTO: 0.2 X10'3 (ref 0–0.9)
EOSINOPHIL NFR BLD AUTO: 2.7 % (ref 0–6)
ERYTHROCYTE [DISTWIDTH] IN BLOOD BY AUTOMATED COUNT: 14.1 % (ref 11.5–14.5)
GFR SERPL CREATININE-BSD FRML MDRD: 55 ML/MIN
GLUCOSE SERPL-MCNC: 128 MG/DL (ref 70–104)
HCT VFR BLD AUTO: 43 % (ref 35–45)
HGB BLD-MCNC: 14.3 G/DL (ref 12–16)
LYMPHOCYTES # BLD AUTO: 2.6 X10'3 (ref 1.1–4.8)
LYMPHOCYTES NFR BLD AUTO: 31.2 % (ref 21–51)
MCH RBC QN AUTO: 29.4 PG (ref 27–31)
MCHC RBC AUTO-ENTMCNC: 33.2 G/DL (ref 33–36.5)
MCV RBC AUTO: 88.5 FL (ref 78–98)
MONOCYTES # BLD AUTO: 0.6 X10'3 (ref 0–0.9)
MONOCYTES NFR BLD AUTO: 7.3 % (ref 2–12)
NEUTROPHILS # BLD AUTO: 4.9 X10'3 (ref 1.8–7.7)
NEUTROPHILS NFR BLD AUTO: 58 % (ref 42–75)
PLATELET # BLD AUTO: 207 X10'3 (ref 140–440)
PMV BLD AUTO: 9.1 FL (ref 7.4–10.4)
POTASSIUM SERPL-SCNC: 3.8 MMOL/L (ref 3.5–5.1)
PROT SERPL-MCNC: 8 G/DL (ref 6.4–8.2)
RBC # BLD AUTO: 4.87 X10'6 (ref 4.2–5.6)
SODIUM SERPL-SCNC: 143 MMOL/L (ref 135–145)
TROPONIN I SERPL-MCNC: < 0.04 NG/ML (ref 0–0.05)
WBC # BLD AUTO: 8.5 X10'3 (ref 4.5–11)

## 2020-07-02 PROCEDURE — 71045 X-RAY EXAM CHEST 1 VIEW: CPT

## 2020-07-02 PROCEDURE — 83880 ASSAY OF NATRIURETIC PEPTIDE: CPT

## 2020-07-02 PROCEDURE — 99285 EMERGENCY DEPT VISIT HI MDM: CPT

## 2020-07-02 PROCEDURE — 85379 FIBRIN DEGRADATION QUANT: CPT

## 2020-07-02 PROCEDURE — 96372 THER/PROPH/DIAG INJ SC/IM: CPT

## 2020-07-02 PROCEDURE — 80053 COMPREHEN METABOLIC PANEL: CPT

## 2020-07-02 PROCEDURE — 85025 COMPLETE CBC W/AUTO DIFF WBC: CPT

## 2020-07-02 PROCEDURE — 93005 ELECTROCARDIOGRAM TRACING: CPT

## 2020-07-02 PROCEDURE — 36415 COLL VENOUS BLD VENIPUNCTURE: CPT

## 2020-07-02 PROCEDURE — 93971 EXTREMITY STUDY: CPT

## 2020-07-02 PROCEDURE — 84484 ASSAY OF TROPONIN QUANT: CPT

## 2020-07-22 ENCOUNTER — HOSPITAL ENCOUNTER (EMERGENCY)
Dept: HOSPITAL 94 - ER | Age: 55
Discharge: LEFT BEFORE DISCHARGE | End: 2020-07-22
Payer: MEDICAID

## 2020-07-22 VITALS — DIASTOLIC BLOOD PRESSURE: 89 MMHG | SYSTOLIC BLOOD PRESSURE: 168 MMHG

## 2020-07-22 VITALS — WEIGHT: 250.53 LBS | BODY MASS INDEX: 37.97 KG/M2 | HEIGHT: 68 IN

## 2020-07-22 DIAGNOSIS — M79.605: Primary | ICD-10-CM

## 2020-07-22 DIAGNOSIS — Z53.21: ICD-10-CM

## 2020-10-12 ENCOUNTER — HOSPITAL ENCOUNTER (EMERGENCY)
Dept: HOSPITAL 94 - ER | Age: 55
Discharge: HOME | End: 2020-10-12
Payer: MEDICAID

## 2020-10-12 VITALS — WEIGHT: 275.58 LBS | BODY MASS INDEX: 41.77 KG/M2 | HEIGHT: 68 IN

## 2020-10-12 VITALS — SYSTOLIC BLOOD PRESSURE: 214 MMHG | DIASTOLIC BLOOD PRESSURE: 102 MMHG

## 2020-10-12 DIAGNOSIS — E11.9: ICD-10-CM

## 2020-10-12 DIAGNOSIS — F12.90: ICD-10-CM

## 2020-10-12 DIAGNOSIS — Z86.73: ICD-10-CM

## 2020-10-12 DIAGNOSIS — L01.00: Primary | ICD-10-CM

## 2020-10-12 DIAGNOSIS — Z98.890: ICD-10-CM

## 2020-10-12 DIAGNOSIS — Z88.1: ICD-10-CM

## 2020-10-12 DIAGNOSIS — I10: ICD-10-CM

## 2020-10-12 DIAGNOSIS — Z98.61: ICD-10-CM

## 2020-10-12 DIAGNOSIS — Z79.82: ICD-10-CM

## 2020-10-12 DIAGNOSIS — I25.2: ICD-10-CM

## 2020-10-12 DIAGNOSIS — F15.90: ICD-10-CM

## 2020-10-12 DIAGNOSIS — Z91.010: ICD-10-CM

## 2020-10-12 DIAGNOSIS — Z88.5: ICD-10-CM

## 2020-10-12 DIAGNOSIS — Z91.018: ICD-10-CM

## 2020-10-12 DIAGNOSIS — Z88.0: ICD-10-CM

## 2020-10-12 DIAGNOSIS — J44.9: ICD-10-CM

## 2020-10-12 DIAGNOSIS — Z79.899: ICD-10-CM

## 2020-10-12 PROCEDURE — 99283 EMERGENCY DEPT VISIT LOW MDM: CPT

## 2020-12-08 ENCOUNTER — HOSPITAL ENCOUNTER (EMERGENCY)
Dept: HOSPITAL 94 - ER | Age: 55
Discharge: HOME | End: 2020-12-08
Payer: MEDICAID

## 2020-12-08 VITALS — SYSTOLIC BLOOD PRESSURE: 163 MMHG | DIASTOLIC BLOOD PRESSURE: 89 MMHG

## 2020-12-08 VITALS — WEIGHT: 262.35 LBS | BODY MASS INDEX: 48.28 KG/M2 | HEIGHT: 62 IN

## 2020-12-08 VITALS — SYSTOLIC BLOOD PRESSURE: 241 MMHG | DIASTOLIC BLOOD PRESSURE: 168 MMHG

## 2020-12-08 VITALS — WEIGHT: 263.45 LBS | BODY MASS INDEX: 39.93 KG/M2 | HEIGHT: 68 IN

## 2020-12-08 DIAGNOSIS — Z86.73: ICD-10-CM

## 2020-12-08 DIAGNOSIS — Z79.899: ICD-10-CM

## 2020-12-08 DIAGNOSIS — Z79.2: ICD-10-CM

## 2020-12-08 DIAGNOSIS — Z91.018: ICD-10-CM

## 2020-12-08 DIAGNOSIS — F15.90: ICD-10-CM

## 2020-12-08 DIAGNOSIS — R04.0: Primary | ICD-10-CM

## 2020-12-08 DIAGNOSIS — Z91.010: ICD-10-CM

## 2020-12-08 DIAGNOSIS — I10: ICD-10-CM

## 2020-12-08 DIAGNOSIS — F12.90: ICD-10-CM

## 2020-12-08 DIAGNOSIS — Z98.890: ICD-10-CM

## 2020-12-08 DIAGNOSIS — I25.2: ICD-10-CM

## 2020-12-08 DIAGNOSIS — Z79.82: ICD-10-CM

## 2020-12-08 DIAGNOSIS — Z88.1: ICD-10-CM

## 2020-12-08 DIAGNOSIS — Z88.5: ICD-10-CM

## 2020-12-08 DIAGNOSIS — J44.9: ICD-10-CM

## 2020-12-08 DIAGNOSIS — G89.29: ICD-10-CM

## 2020-12-08 DIAGNOSIS — M54.89: ICD-10-CM

## 2020-12-08 DIAGNOSIS — E11.9: ICD-10-CM

## 2020-12-08 DIAGNOSIS — Z88.0: ICD-10-CM

## 2020-12-08 LAB
ALBUMIN SERPL BCP-MCNC: 3.6 G/DL (ref 3.4–5)
ALBUMIN/GLOB SERPL: 0.8 {RATIO} (ref 1.1–1.5)
ALP SERPL-CCNC: 102 IU/L (ref 46–116)
ALT SERPL W P-5'-P-CCNC: 78 U/L (ref 12–78)
ANION GAP SERPL CALCULATED.3IONS-SCNC: 7 MMOL/L (ref 8–16)
APTT PPP: 21 SECONDS (ref 22–32)
AST SERPL W P-5'-P-CCNC: 71 U/L (ref 10–37)
BASOPHILS # BLD AUTO: 0.1 X10'3 (ref 0–0.2)
BASOPHILS NFR BLD AUTO: 0.8 % (ref 0–1)
BILIRUB SERPL-MCNC: 0.4 MG/DL (ref 0.1–1)
BUN SERPL-MCNC: 24 MG/DL (ref 7–18)
BUN/CREAT SERPL: 27.6 (ref 6.6–38)
CALCIUM SERPL-MCNC: 8.9 MG/DL (ref 8.5–10.1)
CHLORIDE SERPL-SCNC: 107 MMOL/L (ref 99–107)
CO2 SERPL-SCNC: 27.4 MMOL/L (ref 24–32)
CREAT SERPL-MCNC: 0.87 MG/DL (ref 0.4–0.9)
EOSINOPHIL # BLD AUTO: 0.3 X10'3 (ref 0–0.9)
EOSINOPHIL NFR BLD AUTO: 3.1 % (ref 0–6)
ERYTHROCYTE [DISTWIDTH] IN BLOOD BY AUTOMATED COUNT: 13.8 % (ref 11.5–14.5)
GFR SERPL CREATININE-BSD FRML MDRD: 67 ML/MIN
GLUCOSE SERPL-MCNC: 158 MG/DL (ref 70–104)
HCT VFR BLD AUTO: 41.2 % (ref 35–45)
HGB BLD-MCNC: 13.9 G/DL (ref 12–16)
LYMPHOCYTES # BLD AUTO: 2 X10'3 (ref 1.1–4.8)
LYMPHOCYTES NFR BLD AUTO: 24.7 % (ref 21–51)
MCH RBC QN AUTO: 29.6 PG (ref 27–31)
MCHC RBC AUTO-ENTMCNC: 33.7 G/DL (ref 33–36.5)
MCV RBC AUTO: 87.8 FL (ref 78–98)
MONOCYTES # BLD AUTO: 0.6 X10'3 (ref 0–0.9)
MONOCYTES NFR BLD AUTO: 7.3 % (ref 2–12)
NEUTROPHILS # BLD AUTO: 5.2 X10'3 (ref 1.8–7.7)
NEUTROPHILS NFR BLD AUTO: 64.1 % (ref 42–75)
PLATELET # BLD AUTO: 201 X10'3 (ref 140–440)
PMV BLD AUTO: 9 FL (ref 7.4–10.4)
POTASSIUM SERPL-SCNC: 4.6 MMOL/L (ref 3.5–5.1)
PROT SERPL-MCNC: 7.9 G/DL (ref 6.4–8.2)
RBC # BLD AUTO: 4.69 X10'6 (ref 4.2–5.6)
SODIUM SERPL-SCNC: 141 MMOL/L (ref 135–145)
WBC # BLD AUTO: 8.1 X10'3 (ref 4.5–11)

## 2020-12-08 PROCEDURE — 84484 ASSAY OF TROPONIN QUANT: CPT

## 2020-12-08 PROCEDURE — 80053 COMPREHEN METABOLIC PANEL: CPT

## 2020-12-08 PROCEDURE — 85610 PROTHROMBIN TIME: CPT

## 2020-12-08 PROCEDURE — 96375 TX/PRO/DX INJ NEW DRUG ADDON: CPT

## 2020-12-08 PROCEDURE — 96376 TX/PRO/DX INJ SAME DRUG ADON: CPT

## 2020-12-08 PROCEDURE — 85025 COMPLETE CBC W/AUTO DIFF WBC: CPT

## 2020-12-08 PROCEDURE — 93005 ELECTROCARDIOGRAM TRACING: CPT

## 2020-12-08 PROCEDURE — 85730 THROMBOPLASTIN TIME PARTIAL: CPT

## 2020-12-08 PROCEDURE — 36415 COLL VENOUS BLD VENIPUNCTURE: CPT

## 2020-12-08 PROCEDURE — 99284 EMERGENCY DEPT VISIT MOD MDM: CPT

## 2020-12-08 PROCEDURE — 99285 EMERGENCY DEPT VISIT HI MDM: CPT

## 2020-12-08 PROCEDURE — 96365 THER/PROPH/DIAG IV INF INIT: CPT

## 2020-12-08 PROCEDURE — 30901 CONTROL OF NOSEBLEED: CPT

## 2021-03-31 ENCOUNTER — HOSPITAL ENCOUNTER (INPATIENT)
Dept: HOSPITAL 94 - ER | Age: 56
LOS: 2 days | Discharge: HOME | DRG: 420 | End: 2021-04-02
Attending: HOSPITALIST | Admitting: HOSPITALIST
Payer: MEDICAID

## 2021-03-31 VITALS — WEIGHT: 205.91 LBS | BODY MASS INDEX: 31.21 KG/M2 | HEIGHT: 68 IN

## 2021-03-31 DIAGNOSIS — R07.89: ICD-10-CM

## 2021-03-31 DIAGNOSIS — G89.29: ICD-10-CM

## 2021-03-31 DIAGNOSIS — N18.30: ICD-10-CM

## 2021-03-31 DIAGNOSIS — E11.22: ICD-10-CM

## 2021-03-31 DIAGNOSIS — I12.9: ICD-10-CM

## 2021-03-31 DIAGNOSIS — Z83.3: ICD-10-CM

## 2021-03-31 DIAGNOSIS — I25.10: ICD-10-CM

## 2021-03-31 DIAGNOSIS — F15.90: ICD-10-CM

## 2021-03-31 DIAGNOSIS — Z90.49: ICD-10-CM

## 2021-03-31 DIAGNOSIS — I25.2: ICD-10-CM

## 2021-03-31 DIAGNOSIS — J44.9: ICD-10-CM

## 2021-03-31 DIAGNOSIS — F12.90: ICD-10-CM

## 2021-03-31 DIAGNOSIS — E11.65: Primary | ICD-10-CM

## 2021-03-31 DIAGNOSIS — Z88.0: ICD-10-CM

## 2021-03-31 DIAGNOSIS — Z88.8: ICD-10-CM

## 2021-03-31 DIAGNOSIS — Z86.73: ICD-10-CM

## 2021-03-31 DIAGNOSIS — Z79.899: ICD-10-CM

## 2021-03-31 LAB
ALBUMIN SERPL BCP-MCNC: 3.9 G/DL (ref 3.4–5)
ALBUMIN/GLOB SERPL: 0.8 {RATIO} (ref 1.1–1.5)
ALP SERPL-CCNC: 131 IU/L (ref 46–116)
ALT SERPL W P-5'-P-CCNC: 71 U/L (ref 12–78)
ANION GAP SERPL CALCULATED.3IONS-SCNC: 14 MMOL/L (ref 8–16)
AST SERPL W P-5'-P-CCNC: 51 U/L (ref 10–37)
BASOPHILS # BLD AUTO: 0.1 X10'3 (ref 0–0.2)
BASOPHILS NFR BLD AUTO: 0.6 % (ref 0–1)
BILIRUB SERPL-MCNC: 0.5 MG/DL (ref 0.1–1)
BUN SERPL-MCNC: 31 MG/DL (ref 7–18)
BUN/CREAT SERPL: 22.5 (ref 6.6–38)
CALCIUM SERPL-MCNC: 10.2 MG/DL (ref 8.5–10.1)
CHLORIDE SERPL-SCNC: 96 MMOL/L (ref 99–107)
CO2 SERPL-SCNC: 21.5 MMOL/L (ref 24–32)
CREAT SERPL-MCNC: 1.38 MG/DL (ref 0.4–0.9)
EOSINOPHIL # BLD AUTO: 0.1 X10'3 (ref 0–0.9)
EOSINOPHIL NFR BLD AUTO: 0.6 % (ref 0–6)
ERYTHROCYTE [DISTWIDTH] IN BLOOD BY AUTOMATED COUNT: 14.9 % (ref 11.5–14.5)
GFR SERPL CREATININE-BSD FRML MDRD: 39 ML/MIN
GLUCOSE SERPL-MCNC: 504 MG/DL (ref 70–104)
HBA1C MFR BLD: 10.8 % (ref 4.5–6.2)
HCT VFR BLD AUTO: 43.4 % (ref 35–45)
HGB BLD-MCNC: 14.3 G/DL (ref 12–16)
LYMPHOCYTES # BLD AUTO: 2.4 X10'3 (ref 1.1–4.8)
LYMPHOCYTES NFR BLD AUTO: 24.9 % (ref 21–51)
MCH RBC QN AUTO: 28.4 PG (ref 27–31)
MCHC RBC AUTO-ENTMCNC: 32.8 G/DL (ref 33–36.5)
MCV RBC AUTO: 86.3 FL (ref 78–98)
MONOCYTES # BLD AUTO: 0.8 X10'3 (ref 0–0.9)
MONOCYTES NFR BLD AUTO: 8.4 % (ref 2–12)
NEUTROPHILS # BLD AUTO: 6.4 X10'3 (ref 1.8–7.7)
NEUTROPHILS NFR BLD AUTO: 65.5 % (ref 42–75)
PLATELET # BLD AUTO: 195 X10'3 (ref 140–440)
PMV BLD AUTO: 9.6 FL (ref 7.4–10.4)
POTASSIUM SERPL-SCNC: 4.6 MMOL/L (ref 3.5–5.1)
PROT SERPL-MCNC: 8.6 G/DL (ref 6.4–8.2)
RBC # BLD AUTO: 5.03 X10'6 (ref 4.2–5.6)
SODIUM SERPL-SCNC: 131 MMOL/L (ref 135–145)
WBC # BLD AUTO: 9.8 X10'3 (ref 4.5–11)

## 2021-03-31 PROCEDURE — 80053 COMPREHEN METABOLIC PANEL: CPT

## 2021-03-31 PROCEDURE — 84484 ASSAY OF TROPONIN QUANT: CPT

## 2021-03-31 PROCEDURE — 80048 BASIC METABOLIC PNL TOTAL CA: CPT

## 2021-03-31 PROCEDURE — 93005 ELECTROCARDIOGRAM TRACING: CPT

## 2021-03-31 PROCEDURE — 36415 COLL VENOUS BLD VENIPUNCTURE: CPT

## 2021-03-31 PROCEDURE — 71045 X-RAY EXAM CHEST 1 VIEW: CPT

## 2021-03-31 PROCEDURE — 85379 FIBRIN DEGRADATION QUANT: CPT

## 2021-03-31 PROCEDURE — 78452 HT MUSCLE IMAGE SPECT MULT: CPT

## 2021-03-31 PROCEDURE — 85025 COMPLETE CBC W/AUTO DIFF WBC: CPT

## 2021-03-31 PROCEDURE — 83880 ASSAY OF NATRIURETIC PEPTIDE: CPT

## 2021-03-31 PROCEDURE — 82948 REAGENT STRIP/BLOOD GLUCOSE: CPT

## 2021-03-31 PROCEDURE — 94760 N-INVAS EAR/PLS OXIMETRY 1: CPT

## 2021-03-31 PROCEDURE — 99285 EMERGENCY DEPT VISIT HI MDM: CPT

## 2021-03-31 PROCEDURE — 83036 HEMOGLOBIN GLYCOSYLATED A1C: CPT

## 2021-03-31 PROCEDURE — 80061 LIPID PANEL: CPT

## 2021-03-31 PROCEDURE — 87081 CULTURE SCREEN ONLY: CPT

## 2021-03-31 PROCEDURE — 93017 CV STRESS TEST TRACING ONLY: CPT

## 2021-03-31 PROCEDURE — 94640 AIRWAY INHALATION TREATMENT: CPT

## 2021-03-31 RX ADMIN — INSULIN GLARGINE SCH UNIT: 100 INJECTION, SOLUTION SUBCUTANEOUS at 21:51

## 2021-04-01 VITALS — DIASTOLIC BLOOD PRESSURE: 83 MMHG | SYSTOLIC BLOOD PRESSURE: 141 MMHG

## 2021-04-01 VITALS — DIASTOLIC BLOOD PRESSURE: 55 MMHG | SYSTOLIC BLOOD PRESSURE: 135 MMHG

## 2021-04-01 VITALS — SYSTOLIC BLOOD PRESSURE: 127 MMHG | DIASTOLIC BLOOD PRESSURE: 69 MMHG

## 2021-04-01 VITALS — DIASTOLIC BLOOD PRESSURE: 68 MMHG | SYSTOLIC BLOOD PRESSURE: 141 MMHG

## 2021-04-01 VITALS — SYSTOLIC BLOOD PRESSURE: 153 MMHG | DIASTOLIC BLOOD PRESSURE: 77 MMHG

## 2021-04-01 VITALS — DIASTOLIC BLOOD PRESSURE: 72 MMHG | SYSTOLIC BLOOD PRESSURE: 166 MMHG

## 2021-04-01 VITALS — DIASTOLIC BLOOD PRESSURE: 61 MMHG | SYSTOLIC BLOOD PRESSURE: 119 MMHG

## 2021-04-01 VITALS — SYSTOLIC BLOOD PRESSURE: 141 MMHG | DIASTOLIC BLOOD PRESSURE: 68 MMHG

## 2021-04-01 VITALS — SYSTOLIC BLOOD PRESSURE: 142 MMHG | DIASTOLIC BLOOD PRESSURE: 67 MMHG

## 2021-04-01 VITALS — DIASTOLIC BLOOD PRESSURE: 90 MMHG | SYSTOLIC BLOOD PRESSURE: 169 MMHG

## 2021-04-01 VITALS — SYSTOLIC BLOOD PRESSURE: 148 MMHG | DIASTOLIC BLOOD PRESSURE: 77 MMHG

## 2021-04-01 VITALS — SYSTOLIC BLOOD PRESSURE: 145 MMHG | DIASTOLIC BLOOD PRESSURE: 74 MMHG

## 2021-04-01 LAB
ALBUMIN SERPL BCP-MCNC: 3.9 G/DL (ref 3.4–5)
ANION GAP SERPL CALCULATED.3IONS-SCNC: 16 MMOL/L (ref 8–16)
BASOPHILS # BLD AUTO: 0.1 X10'3 (ref 0–0.2)
BASOPHILS NFR BLD AUTO: 0.8 % (ref 0–1)
BUN SERPL-MCNC: 31 MG/DL (ref 7–18)
BUN/CREAT SERPL: 24 (ref 6.6–38)
CALCIUM SERPL-MCNC: 10.4 MG/DL (ref 8.5–10.1)
CHLORIDE SERPL-SCNC: 98 MMOL/L (ref 99–107)
CHOLEST SERPL-MCNC: 184 MG/DL (ref 0–200)
CHOLEST/HDLC SERPL: 3.2 {RATIO} (ref 0–4.99)
CO2 SERPL-SCNC: 22.5 MMOL/L (ref 24–32)
CREAT SERPL-MCNC: 1.29 MG/DL (ref 0.4–0.9)
D DIMER PPP FEU-MCNC: 0.25 MG/L FEU (ref 0–0.5)
EOSINOPHIL # BLD AUTO: 0.1 X10'3 (ref 0–0.9)
EOSINOPHIL NFR BLD AUTO: 1.4 % (ref 0–6)
ERYTHROCYTE [DISTWIDTH] IN BLOOD BY AUTOMATED COUNT: 14.7 % (ref 11.5–14.5)
GFR SERPL CREATININE-BSD FRML MDRD: 42 ML/MIN
GLUCOSE SERPL-MCNC: 287 MG/DL (ref 70–104)
HCT VFR BLD AUTO: 43.7 % (ref 35–45)
HDLC SERPL-MCNC: 57 MG/DL (ref 35–60)
HGB BLD-MCNC: 14.5 G/DL (ref 12–16)
LDLC SERPL DIRECT ASSAY-MCNC: 109 MG/DL (ref 50–100)
LYMPHOCYTES # BLD AUTO: 3.1 X10'3 (ref 1.1–4.8)
LYMPHOCYTES NFR BLD AUTO: 30.9 % (ref 21–51)
MCH RBC QN AUTO: 28.6 PG (ref 27–31)
MCHC RBC AUTO-ENTMCNC: 33 G/DL (ref 33–36.5)
MCV RBC AUTO: 86.4 FL (ref 78–98)
MONOCYTES # BLD AUTO: 0.8 X10'3 (ref 0–0.9)
MONOCYTES NFR BLD AUTO: 8.3 % (ref 2–12)
NEUTROPHILS # BLD AUTO: 5.8 X10'3 (ref 1.8–7.7)
NEUTROPHILS NFR BLD AUTO: 58.6 % (ref 42–75)
PLATELET # BLD AUTO: 209 X10'3 (ref 140–440)
PMV BLD AUTO: 9.3 FL (ref 7.4–10.4)
POTASSIUM SERPL-SCNC: 4.4 MMOL/L (ref 3.5–5.1)
RBC # BLD AUTO: 5.06 X10'6 (ref 4.2–5.6)
SODIUM SERPL-SCNC: 136 MMOL/L (ref 135–145)
TRIGL SERPL-MCNC: 196 MG/DL (ref 20–135)
WBC # BLD AUTO: 10 X10'3 (ref 4.5–11)

## 2021-04-01 RX ADMIN — BUDESONIDE SCH MG: 0.5 INHALANT RESPIRATORY (INHALATION) at 19:24

## 2021-04-01 RX ADMIN — INSULIN LISPRO SCH UNITS: 100 INJECTION, SOLUTION INTRAVENOUS; SUBCUTANEOUS at 19:36

## 2021-04-01 RX ADMIN — Medication SCH MG: at 08:00

## 2021-04-01 RX ADMIN — INSULIN LISPRO SCH UNITS: 100 INJECTION, SOLUTION INTRAVENOUS; SUBCUTANEOUS at 21:04

## 2021-04-01 RX ADMIN — INSULIN LISPRO SCH UNITS: 100 INJECTION, SOLUTION INTRAVENOUS; SUBCUTANEOUS at 13:49

## 2021-04-01 RX ADMIN — INSULIN GLARGINE SCH UNIT: 100 INJECTION, SOLUTION SUBCUTANEOUS at 21:00

## 2021-04-01 RX ADMIN — BUDESONIDE SCH MG: 0.5 INHALANT RESPIRATORY (INHALATION) at 09:00

## 2021-04-01 RX ADMIN — FLUTICASONE PROPIONATE SCH SPR: 50 SPRAY, METERED NASAL at 11:14

## 2021-04-01 NOTE — NUR
PAGER ID:  [phone] 

MESSAGE:  [id number] edward knowles . complaining of multiple vague issues. please call for further 
update . thank you Fryo staton pcu [phone]

## 2021-04-01 NOTE — NUR
Paged Dr. Yip



PAGER ID:  [phone] 

MESSAGE:  Re: Rosaura Hoffman  [id number]. FYI Leslie test complete and results are in. Thank you 
Mariam CERVANTES [phone]

## 2021-04-01 NOTE — NUR
DM consult: A1c 10.8%. RD intern met with patient at [facility] for 

written/verbal DM education with RD contact information provided. A1c 6.4%

prior visit in May 2020, pt reports since last admit has moved in with 

roommates and had no control over food prepared. Pt also ran out of 

medicine b/c PCP could not refill Rx. Pt reports she is aware her diet 

management is poor, and a typical diet includes sodas, milk, large amounts 

of fruit and snacking late at night. Educated pt on [employer] and importance 

of portion control of foods. Encouraged pt to follow up with PCP and 

contact RDs with any questions. To provide full assessment at date above.

-------------------------------------------------------------------------------

Addendum: 04/01/21 at 1538 by Elizabeth Boggs RD

-------------------------------------------------------------------------------

Amended: Links added.

-------------------------------------------------------------------------------

Addendum: 04/01/21 at 1538 by Raj Hendricks RD

-------------------------------------------------------------------------------

TRISTON agrees w/ above dietetic intern note.

## 2021-04-01 NOTE — NUR
Problems reprioritized. Patient report given, questions answered & plan of care reviewed 
with Juan José CERVANTES.

## 2021-04-01 NOTE — NUR
Patient was educated on the insulin administration. A demonstrated by RN after lunch was 
consumed, Pt was able to draw up own insulin based on the calculation of carbs and BS to 
Hospital protocol. The intent was to educate pt in the administration of insulin in a safe 
manner to be able to do it herself at home when discharged. Patient then physically drawn up 
insulin, cleaned area and administered insulin. Patient tolerated well and was able to 
vocalize self help instructions. All questions were answered. As demonstration was 
conducted, the Dietary consult was also conducted with an A1C of 10.8. We will continue to 
help educate patient for when she is discharged.

## 2021-04-01 NOTE — NUR
Patient in room PCU 3017. I have received report from Juan José CERVANTES and had the opportunity to ask 
questions and assume patient care.

## 2021-04-01 NOTE — NUR
Paged Dr. Yip,



 



PAGER ID:  [phone] 

MESSAGE:  Re: Rosaura Hoffman RM [id number]. Pt requesting Tylenol for headache. Please Advise, 
Thank you Mariam CERVANTES [phone]


-------------------------------------------------------------------------------

Addendum: 04/01/21 at 1754 by Mariam Roger RN

-------------------------------------------------------------------------------

Dr. Phi espino and OK Tylenol.

## 2021-04-02 VITALS — DIASTOLIC BLOOD PRESSURE: 65 MMHG | SYSTOLIC BLOOD PRESSURE: 110 MMHG

## 2021-04-02 VITALS — SYSTOLIC BLOOD PRESSURE: 119 MMHG | DIASTOLIC BLOOD PRESSURE: 79 MMHG

## 2021-04-02 VITALS — DIASTOLIC BLOOD PRESSURE: 69 MMHG | SYSTOLIC BLOOD PRESSURE: 140 MMHG

## 2021-04-02 LAB
ALBUMIN SERPL BCP-MCNC: 3.3 G/DL (ref 3.4–5)
ANION GAP SERPL CALCULATED.3IONS-SCNC: 11 MMOL/L (ref 8–16)
BASOPHILS # BLD AUTO: 0 X10'3 (ref 0–0.2)
BASOPHILS NFR BLD AUTO: 0.7 % (ref 0–1)
BUN SERPL-MCNC: 31 MG/DL (ref 7–18)
BUN/CREAT SERPL: 22.6 (ref 6.6–38)
CALCIUM SERPL-MCNC: 9 MG/DL (ref 8.5–10.1)
CHLORIDE SERPL-SCNC: 101 MMOL/L (ref 99–107)
CO2 SERPL-SCNC: 22.6 MMOL/L (ref 24–32)
CREAT SERPL-MCNC: 1.37 MG/DL (ref 0.4–0.9)
EOSINOPHIL # BLD AUTO: 0.1 X10'3 (ref 0–0.9)
EOSINOPHIL NFR BLD AUTO: 2.1 % (ref 0–6)
ERYTHROCYTE [DISTWIDTH] IN BLOOD BY AUTOMATED COUNT: 15 % (ref 11.5–14.5)
GFR SERPL CREATININE-BSD FRML MDRD: 40 ML/MIN
GLUCOSE SERPL-MCNC: 260 MG/DL (ref 70–104)
HCT VFR BLD AUTO: 43.5 % (ref 35–45)
HGB BLD-MCNC: 14.1 G/DL (ref 12–16)
LYMPHOCYTES # BLD AUTO: 1.5 X10'3 (ref 1.1–4.8)
LYMPHOCYTES NFR BLD AUTO: 22.1 % (ref 21–51)
MCH RBC QN AUTO: 28.5 PG (ref 27–31)
MCHC RBC AUTO-ENTMCNC: 32.5 G/DL (ref 33–36.5)
MCV RBC AUTO: 87.8 FL (ref 78–98)
MONOCYTES # BLD AUTO: 0.6 X10'3 (ref 0–0.9)
MONOCYTES NFR BLD AUTO: 8.6 % (ref 2–12)
NEUTROPHILS # BLD AUTO: 4.6 X10'3 (ref 1.8–7.7)
NEUTROPHILS NFR BLD AUTO: 66.5 % (ref 42–75)
PLATELET # BLD AUTO: 175 X10'3 (ref 140–440)
PMV BLD AUTO: 9.8 FL (ref 7.4–10.4)
POTASSIUM SERPL-SCNC: 4.8 MMOL/L (ref 3.5–5.1)
RBC # BLD AUTO: 4.95 X10'6 (ref 4.2–5.6)
SODIUM SERPL-SCNC: 135 MMOL/L (ref 135–145)
WBC # BLD AUTO: 6.9 X10'3 (ref 4.5–11)

## 2021-04-02 RX ADMIN — Medication SCH MG: at 08:11

## 2021-04-02 RX ADMIN — BUDESONIDE SCH MG: 0.5 INHALANT RESPIRATORY (INHALATION) at 08:25

## 2021-04-02 RX ADMIN — INSULIN LISPRO SCH UNITS: 100 INJECTION, SOLUTION INTRAVENOUS; SUBCUTANEOUS at 08:32

## 2021-04-02 RX ADMIN — INSULIN LISPRO SCH UNITS: 100 INJECTION, SOLUTION INTRAVENOUS; SUBCUTANEOUS at 13:24

## 2021-04-02 RX ADMIN — FLUTICASONE PROPIONATE SCH SPR: 50 SPRAY, METERED NASAL at 08:11

## 2021-04-02 NOTE — NUR
DM Consult: Addressed; see prior RD note.

-------------------------------------------------------------------------------

Addendum: 04/02/21 at 1242 by Raj Hendricks RD

-------------------------------------------------------------------------------

Amended: Links added.

## 2021-04-02 NOTE — NUR
Patient OK to discharge per MD orders. Patient was again educated on the insulin procedure 
and care of it. The insulin pen was demonstrated. Patient was able to verbalize 
acknowledgment and demonstrate the process. Patient was able to dress self, PIV was removed 
and tele was removed, pt tolerated well. Patients items and teaching supplies were sent with 
patient. Patient was escorted to the front lobby via wheelchair and RN. A private vehicle 
was waiting outfront. Patient was seen getting into vehicle with all personal items.

## 2021-04-02 NOTE — NUR
Dr. Yip at bedside with nurse and patient. MD is aware of pts complaints and concerns. 
Patient will be sent home on insulin with the intent to follow up with her PCP in 2 weeks. 
Patient was able to self inject self this am as well after eating breakfast. Pt c/o of rib 
pain as she states she fell before admission. All supplies were given and educated on. 
Patient was able to verbalize understanding and demonstrate the injection procedure. MD will 
DC to home today. Will continue to monitor.

-------------------------------------------------------------------------------

Addendum: 04/02/21 at 0901 by Mariam Roger RN

-------------------------------------------------------------------------------

Dr. Yip discussed with pt about the pain she has in lung, CXR showed no fractures.

## 2021-09-16 ENCOUNTER — HOSPITAL ENCOUNTER (EMERGENCY)
Dept: HOSPITAL 94 - ER | Age: 56
Discharge: HOME | End: 2021-09-16
Payer: MEDICAID

## 2021-09-16 VITALS — DIASTOLIC BLOOD PRESSURE: 99 MMHG | SYSTOLIC BLOOD PRESSURE: 188 MMHG

## 2021-09-16 VITALS — HEIGHT: 68 IN | WEIGHT: 254.19 LBS | BODY MASS INDEX: 38.52 KG/M2

## 2021-09-16 DIAGNOSIS — M54.5: ICD-10-CM

## 2021-09-16 DIAGNOSIS — I11.9: ICD-10-CM

## 2021-09-16 DIAGNOSIS — F12.10: ICD-10-CM

## 2021-09-16 DIAGNOSIS — Z88.0: ICD-10-CM

## 2021-09-16 DIAGNOSIS — E11.9: ICD-10-CM

## 2021-09-16 DIAGNOSIS — Z88.5: ICD-10-CM

## 2021-09-16 DIAGNOSIS — Z91.010: ICD-10-CM

## 2021-09-16 DIAGNOSIS — J45.909: ICD-10-CM

## 2021-09-16 DIAGNOSIS — F15.10: ICD-10-CM

## 2021-09-16 DIAGNOSIS — M79.605: Primary | ICD-10-CM

## 2021-09-16 DIAGNOSIS — Z88.8: ICD-10-CM

## 2021-09-16 DIAGNOSIS — Z88.1: ICD-10-CM

## 2021-09-16 PROCEDURE — 99283 EMERGENCY DEPT VISIT LOW MDM: CPT

## 2021-09-16 PROCEDURE — 72100 X-RAY EXAM L-S SPINE 2/3 VWS: CPT

## 2021-10-12 ENCOUNTER — HOSPITAL ENCOUNTER (EMERGENCY)
Dept: HOSPITAL 94 - ER | Age: 56
Discharge: HOME | End: 2021-10-12
Payer: MEDICAID

## 2021-10-12 VITALS — DIASTOLIC BLOOD PRESSURE: 94 MMHG | SYSTOLIC BLOOD PRESSURE: 169 MMHG

## 2021-10-12 VITALS — WEIGHT: 243.5 LBS | HEIGHT: 68 IN | BODY MASS INDEX: 36.9 KG/M2

## 2021-10-12 DIAGNOSIS — Z79.4: ICD-10-CM

## 2021-10-12 DIAGNOSIS — Z91.018: ICD-10-CM

## 2021-10-12 DIAGNOSIS — Z88.5: ICD-10-CM

## 2021-10-12 DIAGNOSIS — I25.2: ICD-10-CM

## 2021-10-12 DIAGNOSIS — Z79.82: ICD-10-CM

## 2021-10-12 DIAGNOSIS — Z88.0: ICD-10-CM

## 2021-10-12 DIAGNOSIS — E11.9: ICD-10-CM

## 2021-10-12 DIAGNOSIS — R53.83: ICD-10-CM

## 2021-10-12 DIAGNOSIS — Z86.73: ICD-10-CM

## 2021-10-12 DIAGNOSIS — Z79.899: ICD-10-CM

## 2021-10-12 DIAGNOSIS — J44.9: ICD-10-CM

## 2021-10-12 DIAGNOSIS — I10: ICD-10-CM

## 2021-10-12 DIAGNOSIS — R07.89: ICD-10-CM

## 2021-10-12 DIAGNOSIS — R00.0: Primary | ICD-10-CM

## 2021-10-12 DIAGNOSIS — F12.90: ICD-10-CM

## 2021-10-12 DIAGNOSIS — I25.10: ICD-10-CM

## 2021-10-12 DIAGNOSIS — Z88.1: ICD-10-CM

## 2021-10-12 DIAGNOSIS — F15.90: ICD-10-CM

## 2021-10-12 DIAGNOSIS — Z98.890: ICD-10-CM

## 2021-10-12 DIAGNOSIS — G89.29: ICD-10-CM

## 2021-10-12 DIAGNOSIS — Z88.8: ICD-10-CM

## 2021-10-12 LAB
ALBUMIN SERPL BCP-MCNC: 3.6 G/DL (ref 3.4–5)
ALBUMIN/GLOB SERPL: 0.7 {RATIO} (ref 1.1–1.5)
ALP SERPL-CCNC: 172 IU/L (ref 46–116)
ALT SERPL W P-5'-P-CCNC: 77 U/L (ref 12–78)
ANION GAP SERPL CALCULATED.3IONS-SCNC: 15 MMOL/L (ref 8–16)
AST SERPL W P-5'-P-CCNC: 68 U/L (ref 10–37)
BASOPHILS # BLD AUTO: 0.1 X10'3 (ref 0–0.2)
BASOPHILS NFR BLD AUTO: 0.7 % (ref 0–1)
BILIRUB SERPL-MCNC: 0.5 MG/DL (ref 0.1–1)
BUN SERPL-MCNC: 20 MG/DL (ref 7–18)
BUN/CREAT SERPL: 20.6 (ref 6.6–38)
CALCIUM SERPL-MCNC: 9.5 MG/DL (ref 8.5–10.1)
CHLORIDE SERPL-SCNC: 100 MMOL/L (ref 99–107)
CO2 SERPL-SCNC: 21.6 MMOL/L (ref 24–32)
CREAT SERPL-MCNC: 0.97 MG/DL (ref 0.4–0.9)
D DIMER PPP FEU-MCNC: < 0.19 MG/L FEU (ref 0–0.5)
EOSINOPHIL # BLD AUTO: 0.1 X10'3 (ref 0–0.9)
EOSINOPHIL NFR BLD AUTO: 0.9 % (ref 0–6)
ERYTHROCYTE [DISTWIDTH] IN BLOOD BY AUTOMATED COUNT: 14 % (ref 11.5–14.5)
GFR SERPL CREATININE-BSD FRML MDRD: 59 ML/MIN
GLUCOSE SERPL-MCNC: 337 MG/DL (ref 70–104)
HCT VFR BLD AUTO: 44 % (ref 35–45)
HGB BLD-MCNC: 14.8 G/DL (ref 12–16)
LYMPHOCYTES # BLD AUTO: 2.2 X10'3 (ref 1.1–4.8)
LYMPHOCYTES NFR BLD AUTO: 24.2 % (ref 21–51)
MAGNESIUM SERPL-MCNC: 1.9 MG/DL (ref 1.5–2.4)
MCH RBC QN AUTO: 29.1 PG (ref 27–31)
MCHC RBC AUTO-ENTMCNC: 33.5 G/DL (ref 33–36.5)
MCV RBC AUTO: 86.9 FL (ref 78–98)
MONOCYTES # BLD AUTO: 0.6 X10'3 (ref 0–0.9)
MONOCYTES NFR BLD AUTO: 7 % (ref 2–12)
NEUTROPHILS # BLD AUTO: 6.2 X10'3 (ref 1.8–7.7)
NEUTROPHILS NFR BLD AUTO: 67.2 % (ref 42–75)
PLATELET # BLD AUTO: 223 X10'3 (ref 140–440)
PMV BLD AUTO: 9.7 FL (ref 7.4–10.4)
POTASSIUM SERPL-SCNC: 4.1 MMOL/L (ref 3.5–5.1)
PROT SERPL-MCNC: 8.6 G/DL (ref 6.4–8.2)
RBC # BLD AUTO: 5.06 X10'6 (ref 4.2–5.6)
SODIUM SERPL-SCNC: 137 MMOL/L (ref 135–145)
WBC # BLD AUTO: 9.2 X10'3 (ref 4.5–11)

## 2021-10-12 PROCEDURE — 85379 FIBRIN DEGRADATION QUANT: CPT

## 2021-10-12 PROCEDURE — 80053 COMPREHEN METABOLIC PANEL: CPT

## 2021-10-12 PROCEDURE — 96360 HYDRATION IV INFUSION INIT: CPT

## 2021-10-12 PROCEDURE — 93005 ELECTROCARDIOGRAM TRACING: CPT

## 2021-10-12 PROCEDURE — 36415 COLL VENOUS BLD VENIPUNCTURE: CPT

## 2021-10-12 PROCEDURE — 84484 ASSAY OF TROPONIN QUANT: CPT

## 2021-10-12 PROCEDURE — 85025 COMPLETE CBC W/AUTO DIFF WBC: CPT

## 2021-10-12 PROCEDURE — 83735 ASSAY OF MAGNESIUM: CPT

## 2021-10-12 PROCEDURE — 99285 EMERGENCY DEPT VISIT HI MDM: CPT

## 2021-10-12 PROCEDURE — 83880 ASSAY OF NATRIURETIC PEPTIDE: CPT

## 2021-10-12 PROCEDURE — 71045 X-RAY EXAM CHEST 1 VIEW: CPT

## 2021-10-26 ENCOUNTER — HOSPITAL ENCOUNTER (EMERGENCY)
Dept: HOSPITAL 94 - ER | Age: 56
Discharge: LEFT BEFORE DISCHARGE | End: 2021-10-26
Payer: MEDICAID

## 2021-10-26 VITALS — HEIGHT: 65 IN | BODY MASS INDEX: 40.77 KG/M2 | WEIGHT: 244.71 LBS

## 2021-10-26 VITALS — DIASTOLIC BLOOD PRESSURE: 89 MMHG | SYSTOLIC BLOOD PRESSURE: 141 MMHG

## 2021-10-26 DIAGNOSIS — M25.562: ICD-10-CM

## 2021-10-26 DIAGNOSIS — I10: ICD-10-CM

## 2021-10-26 DIAGNOSIS — F15.90: ICD-10-CM

## 2021-10-26 DIAGNOSIS — Z88.5: ICD-10-CM

## 2021-10-26 DIAGNOSIS — Z86.73: ICD-10-CM

## 2021-10-26 DIAGNOSIS — F12.90: ICD-10-CM

## 2021-10-26 DIAGNOSIS — J44.9: ICD-10-CM

## 2021-10-26 DIAGNOSIS — R06.02: ICD-10-CM

## 2021-10-26 DIAGNOSIS — I25.10: ICD-10-CM

## 2021-10-26 DIAGNOSIS — Z79.82: ICD-10-CM

## 2021-10-26 DIAGNOSIS — R11.0: ICD-10-CM

## 2021-10-26 DIAGNOSIS — R07.89: Primary | ICD-10-CM

## 2021-10-26 DIAGNOSIS — G89.29: ICD-10-CM

## 2021-10-26 DIAGNOSIS — I25.2: ICD-10-CM

## 2021-10-26 DIAGNOSIS — Z79.4: ICD-10-CM

## 2021-10-26 DIAGNOSIS — Z88.1: ICD-10-CM

## 2021-10-26 DIAGNOSIS — Z98.890: ICD-10-CM

## 2021-10-26 DIAGNOSIS — Z88.0: ICD-10-CM

## 2021-10-26 DIAGNOSIS — Z79.899: ICD-10-CM

## 2021-10-26 DIAGNOSIS — Z91.010: ICD-10-CM

## 2021-10-26 LAB
ALBUMIN SERPL BCP-MCNC: 3.2 G/DL (ref 3.4–5)
ALBUMIN/GLOB SERPL: 0.7 {RATIO} (ref 1.1–1.5)
ALP SERPL-CCNC: 134 IU/L (ref 46–116)
ALT SERPL W P-5'-P-CCNC: 119 U/L (ref 12–78)
ANION GAP SERPL CALCULATED.3IONS-SCNC: 16 MMOL/L (ref 8–16)
AST SERPL W P-5'-P-CCNC: 116 U/L (ref 10–37)
BASOPHILS # BLD AUTO: 0 X10'3 (ref 0–0.2)
BASOPHILS NFR BLD AUTO: 0.5 % (ref 0–1)
BILIRUB SERPL-MCNC: 0.3 MG/DL (ref 0.1–1)
BUN SERPL-MCNC: 18 MG/DL (ref 7–18)
BUN/CREAT SERPL: 17.5 (ref 6.6–38)
CALCIUM SERPL-MCNC: 8.9 MG/DL (ref 8.5–10.1)
CHLORIDE SERPL-SCNC: 104 MMOL/L (ref 99–107)
CO2 SERPL-SCNC: 19.3 MMOL/L (ref 24–32)
CREAT SERPL-MCNC: 1.03 MG/DL (ref 0.4–0.9)
EOSINOPHIL # BLD AUTO: 0.1 X10'3 (ref 0–0.9)
EOSINOPHIL NFR BLD AUTO: 2.2 % (ref 0–6)
ERYTHROCYTE [DISTWIDTH] IN BLOOD BY AUTOMATED COUNT: 14.3 % (ref 11.5–14.5)
GFR SERPL CREATININE-BSD FRML MDRD: 55 ML/MIN
GLUCOSE SERPL-MCNC: 326 MG/DL (ref 70–104)
HCT VFR BLD AUTO: 39.1 % (ref 35–45)
HGB BLD-MCNC: 12.9 G/DL (ref 12–16)
LYMPHOCYTES # BLD AUTO: 1.7 X10'3 (ref 1.1–4.8)
LYMPHOCYTES NFR BLD AUTO: 29.1 % (ref 21–51)
MAGNESIUM SERPL-MCNC: 1.7 MG/DL (ref 1.5–2.4)
MCH RBC QN AUTO: 29 PG (ref 27–31)
MCHC RBC AUTO-ENTMCNC: 33.1 G/DL (ref 33–36.5)
MCV RBC AUTO: 87.8 FL (ref 78–98)
MONOCYTES # BLD AUTO: 0.5 X10'3 (ref 0–0.9)
MONOCYTES NFR BLD AUTO: 7.9 % (ref 2–12)
NEUTROPHILS # BLD AUTO: 3.5 X10'3 (ref 1.8–7.7)
NEUTROPHILS NFR BLD AUTO: 60.3 % (ref 42–75)
PLATELET # BLD AUTO: 164 X10'3 (ref 140–440)
PMV BLD AUTO: 9.4 FL (ref 7.4–10.4)
POTASSIUM SERPL-SCNC: 4.1 MMOL/L (ref 3.5–5.1)
PROT SERPL-MCNC: 7.6 G/DL (ref 6.4–8.2)
RBC # BLD AUTO: 4.46 X10'6 (ref 4.2–5.6)
SODIUM SERPL-SCNC: 139 MMOL/L (ref 135–145)
WBC # BLD AUTO: 5.8 X10'3 (ref 4.5–11)

## 2021-10-26 PROCEDURE — 85025 COMPLETE CBC W/AUTO DIFF WBC: CPT

## 2021-10-26 PROCEDURE — 99285 EMERGENCY DEPT VISIT HI MDM: CPT

## 2021-10-26 PROCEDURE — 73564 X-RAY EXAM KNEE 4 OR MORE: CPT

## 2021-10-26 PROCEDURE — 71045 X-RAY EXAM CHEST 1 VIEW: CPT

## 2021-10-26 PROCEDURE — 83880 ASSAY OF NATRIURETIC PEPTIDE: CPT

## 2021-10-26 PROCEDURE — 80053 COMPREHEN METABOLIC PANEL: CPT

## 2021-10-26 PROCEDURE — 84484 ASSAY OF TROPONIN QUANT: CPT

## 2021-10-26 PROCEDURE — 93005 ELECTROCARDIOGRAM TRACING: CPT

## 2021-10-26 PROCEDURE — 36415 COLL VENOUS BLD VENIPUNCTURE: CPT

## 2021-10-26 PROCEDURE — 73590 X-RAY EXAM OF LOWER LEG: CPT

## 2021-10-26 PROCEDURE — 83735 ASSAY OF MAGNESIUM: CPT

## 2021-11-12 ENCOUNTER — HOSPITAL ENCOUNTER (EMERGENCY)
Dept: HOSPITAL 94 - ER | Age: 56
Discharge: HOME | End: 2021-11-12
Payer: MEDICAID

## 2021-11-12 VITALS — WEIGHT: 239.51 LBS | BODY MASS INDEX: 36.3 KG/M2 | HEIGHT: 68 IN

## 2021-11-12 VITALS — DIASTOLIC BLOOD PRESSURE: 93 MMHG | SYSTOLIC BLOOD PRESSURE: 156 MMHG

## 2021-11-12 DIAGNOSIS — J44.9: ICD-10-CM

## 2021-11-12 DIAGNOSIS — E11.9: ICD-10-CM

## 2021-11-12 DIAGNOSIS — I10: ICD-10-CM

## 2021-11-12 DIAGNOSIS — L03.211: Primary | ICD-10-CM

## 2021-11-12 DIAGNOSIS — I25.2: ICD-10-CM

## 2021-11-12 DIAGNOSIS — K04.7: ICD-10-CM

## 2021-11-12 DIAGNOSIS — Z88.0: ICD-10-CM

## 2021-11-12 DIAGNOSIS — Z88.5: ICD-10-CM

## 2021-11-12 DIAGNOSIS — Z79.4: ICD-10-CM

## 2021-11-12 DIAGNOSIS — G89.29: ICD-10-CM

## 2021-11-12 DIAGNOSIS — Z86.73: ICD-10-CM

## 2021-11-12 DIAGNOSIS — F12.90: ICD-10-CM

## 2021-11-12 DIAGNOSIS — Z79.82: ICD-10-CM

## 2021-11-12 DIAGNOSIS — Z91.018: ICD-10-CM

## 2021-11-12 DIAGNOSIS — Z91.010: ICD-10-CM

## 2021-11-12 DIAGNOSIS — Z88.1: ICD-10-CM

## 2021-11-12 DIAGNOSIS — I25.10: ICD-10-CM

## 2021-11-12 DIAGNOSIS — Z98.890: ICD-10-CM

## 2021-11-12 DIAGNOSIS — Z79.899: ICD-10-CM

## 2021-11-12 DIAGNOSIS — F15.90: ICD-10-CM

## 2021-11-12 PROCEDURE — 99285 EMERGENCY DEPT VISIT HI MDM: CPT

## 2021-11-12 PROCEDURE — 96365 THER/PROPH/DIAG IV INF INIT: CPT

## 2021-11-12 PROCEDURE — 96368 THER/DIAG CONCURRENT INF: CPT

## 2021-11-12 PROCEDURE — 96372 THER/PROPH/DIAG INJ SC/IM: CPT

## 2021-11-12 PROCEDURE — 96366 THER/PROPH/DIAG IV INF ADDON: CPT

## 2021-11-12 PROCEDURE — 82948 REAGENT STRIP/BLOOD GLUCOSE: CPT

## 2022-01-14 ENCOUNTER — HOSPITAL ENCOUNTER (EMERGENCY)
Dept: HOSPITAL 94 - ER | Age: 57
Discharge: HOME | End: 2022-01-14
Payer: MEDICAID

## 2022-01-14 VITALS — SYSTOLIC BLOOD PRESSURE: 149 MMHG | DIASTOLIC BLOOD PRESSURE: 72 MMHG

## 2022-01-14 VITALS — HEIGHT: 68 IN | BODY MASS INDEX: 36.25 KG/M2 | WEIGHT: 239.2 LBS

## 2022-01-14 DIAGNOSIS — F15.90: ICD-10-CM

## 2022-01-14 DIAGNOSIS — Z79.899: ICD-10-CM

## 2022-01-14 DIAGNOSIS — Z79.4: ICD-10-CM

## 2022-01-14 DIAGNOSIS — I25.2: ICD-10-CM

## 2022-01-14 DIAGNOSIS — I25.10: ICD-10-CM

## 2022-01-14 DIAGNOSIS — I11.0: ICD-10-CM

## 2022-01-14 DIAGNOSIS — N39.0: ICD-10-CM

## 2022-01-14 DIAGNOSIS — G89.29: ICD-10-CM

## 2022-01-14 DIAGNOSIS — Z88.5: ICD-10-CM

## 2022-01-14 DIAGNOSIS — Z95.5: ICD-10-CM

## 2022-01-14 DIAGNOSIS — Z79.2: ICD-10-CM

## 2022-01-14 DIAGNOSIS — Z86.73: ICD-10-CM

## 2022-01-14 DIAGNOSIS — F12.90: ICD-10-CM

## 2022-01-14 DIAGNOSIS — J44.9: ICD-10-CM

## 2022-01-14 DIAGNOSIS — Z88.0: ICD-10-CM

## 2022-01-14 DIAGNOSIS — Z88.1: ICD-10-CM

## 2022-01-14 DIAGNOSIS — E11.9: ICD-10-CM

## 2022-01-14 DIAGNOSIS — Z91.010: ICD-10-CM

## 2022-01-14 DIAGNOSIS — Z91.018: ICD-10-CM

## 2022-01-14 DIAGNOSIS — I50.9: ICD-10-CM

## 2022-01-14 DIAGNOSIS — R06.02: ICD-10-CM

## 2022-01-14 DIAGNOSIS — U07.1: Primary | ICD-10-CM

## 2022-01-14 LAB
ALBUMIN SERPL BCP-MCNC: 3 G/DL (ref 3.4–5)
ALBUMIN/GLOB SERPL: 0.6 {RATIO} (ref 1.1–1.5)
ALP SERPL-CCNC: 151 IU/L (ref 46–116)
ALT SERPL W P-5'-P-CCNC: 69 U/L (ref 12–78)
ANION GAP SERPL CALCULATED.3IONS-SCNC: 11 MMOL/L (ref 8–16)
AST SERPL W P-5'-P-CCNC: 96 U/L (ref 10–37)
BACTERIA URNS QL MICRO: (no result) /HPF
BASOPHILS # BLD AUTO: 0 X10'3 (ref 0–0.2)
BASOPHILS NFR BLD AUTO: 0.4 % (ref 0–1)
BILIRUB SERPL-MCNC: 0.6 MG/DL (ref 0.1–1)
BUN SERPL-MCNC: 14 MG/DL (ref 7–18)
BUN/CREAT SERPL: 14.3 (ref 6.6–38)
CALCIUM SERPL-MCNC: 8.4 MG/DL (ref 8.5–10.1)
CHLORIDE SERPL-SCNC: 97 MMOL/L (ref 99–107)
CLARITY UR: (no result)
CO2 SERPL-SCNC: 24.3 MMOL/L (ref 24–32)
COLOR UR: (no result)
CREAT SERPL-MCNC: 0.98 MG/DL (ref 0.4–0.9)
D DIMER PPP FEU-MCNC: 0.35 MG/L FEU (ref 0–0.5)
DEPRECATED SQUAMOUS URNS QL MICRO: (no result) /LPF
EOSINOPHIL # BLD AUTO: 0 X10'3 (ref 0–0.9)
EOSINOPHIL NFR BLD AUTO: 0.1 % (ref 0–6)
ERYTHROCYTE [DISTWIDTH] IN BLOOD BY AUTOMATED COUNT: 13.8 % (ref 11.5–14.5)
GFR SERPL CREATININE-BSD FRML MDRD: 58 ML/MIN
GLUCOSE SERPL-MCNC: 336 MG/DL (ref 70–104)
GLUCOSE UR STRIP-MCNC: >=1000 MG/DL
HCT VFR BLD AUTO: 38.6 % (ref 35–45)
HGB BLD-MCNC: 13 G/DL (ref 12–16)
HGB UR QL STRIP: NEGATIVE
KETONES UR STRIP-MCNC: (no result) MG/DL
LEUKOCYTE ESTERASE UR QL STRIP: (no result)
LYMPHOCYTES # BLD AUTO: 0.9 X10'3 (ref 1.1–4.8)
LYMPHOCYTES NFR BLD AUTO: 25.5 % (ref 21–51)
MCH RBC QN AUTO: 28.9 PG (ref 27–31)
MCHC RBC AUTO-ENTMCNC: 33.8 G/DL (ref 33–36.5)
MCV RBC AUTO: 85.4 FL (ref 78–98)
MONOCYTES # BLD AUTO: 0.3 X10'3 (ref 0–0.9)
MONOCYTES NFR BLD AUTO: 8.6 % (ref 2–12)
NEUTROPHILS # BLD AUTO: 2.3 X10'3 (ref 1.8–7.7)
NEUTROPHILS NFR BLD AUTO: 65.4 % (ref 42–75)
NITRITE UR QL STRIP: NEGATIVE
PH UR STRIP: 6 [PH] (ref 4.8–8)
PLATELET # BLD AUTO: 139 X10'3 (ref 140–440)
PMV BLD AUTO: 9.3 FL (ref 7.4–10.4)
POTASSIUM SERPL-SCNC: 3.9 MMOL/L (ref 3.5–5.1)
PROT SERPL-MCNC: 7.7 G/DL (ref 6.4–8.2)
PROT UR QL STRIP: (no result) MG/DL
RBC # BLD AUTO: 4.52 X10'6 (ref 4.2–5.6)
RBC #/AREA URNS HPF: (no result) /HPF (ref 0–2)
SODIUM SERPL-SCNC: 132 MMOL/L (ref 135–145)
SP GR UR STRIP: 1.02 (ref 1–1.03)
URN COLLECT METHOD CLASS: (no result)
UROBILINOGEN UR STRIP-MCNC: 0.2 E.U/DL (ref 0.2–1)
WBC # BLD AUTO: 3.5 X10'3 (ref 4.5–11)
WBC #/AREA URNS HPF: (no result) /HPF (ref 0–4)
WBC CLUMPS #/AREA URNS HPF: (no result) /HPF

## 2022-01-14 PROCEDURE — 87088 URINE BACTERIA CULTURE: CPT

## 2022-01-14 PROCEDURE — 96365 THER/PROPH/DIAG IV INF INIT: CPT

## 2022-01-14 PROCEDURE — 87635 SARS-COV-2 COVID-19 AMP PRB: CPT

## 2022-01-14 PROCEDURE — 80053 COMPREHEN METABOLIC PANEL: CPT

## 2022-01-14 PROCEDURE — 85025 COMPLETE CBC W/AUTO DIFF WBC: CPT

## 2022-01-14 PROCEDURE — 83605 ASSAY OF LACTIC ACID: CPT

## 2022-01-14 PROCEDURE — 74176 CT ABD & PELVIS W/O CONTRAST: CPT

## 2022-01-14 PROCEDURE — 84484 ASSAY OF TROPONIN QUANT: CPT

## 2022-01-14 PROCEDURE — 83880 ASSAY OF NATRIURETIC PEPTIDE: CPT

## 2022-01-14 PROCEDURE — 87502 INFLUENZA DNA AMP PROBE: CPT

## 2022-01-14 PROCEDURE — 85379 FIBRIN DEGRADATION QUANT: CPT

## 2022-01-14 PROCEDURE — 81001 URINALYSIS AUTO W/SCOPE: CPT

## 2022-01-14 PROCEDURE — 71045 X-RAY EXAM CHEST 1 VIEW: CPT

## 2022-01-14 PROCEDURE — 87040 BLOOD CULTURE FOR BACTERIA: CPT

## 2022-01-14 PROCEDURE — 99285 EMERGENCY DEPT VISIT HI MDM: CPT

## 2022-01-14 PROCEDURE — 93005 ELECTROCARDIOGRAM TRACING: CPT

## 2022-01-14 PROCEDURE — 84145 PROCALCITONIN (PCT): CPT

## 2022-01-14 PROCEDURE — 96366 THER/PROPH/DIAG IV INF ADDON: CPT

## 2022-01-14 PROCEDURE — 87503 INFLUENZA DNA AMP PROB ADDL: CPT

## 2022-01-14 PROCEDURE — 36415 COLL VENOUS BLD VENIPUNCTURE: CPT

## 2022-01-14 RX ADMIN — CEFTRIAXONE ONE MLS/HR: 1 INJECTION, SOLUTION INTRAVENOUS at 06:11

## 2023-04-06 ENCOUNTER — HOSPITAL ENCOUNTER (EMERGENCY)
Dept: HOSPITAL 94 - ER | Age: 58
LOS: 1 days | Discharge: HOME | End: 2023-04-07
Payer: MEDICAID

## 2023-04-06 VITALS — HEIGHT: 68 IN | BODY MASS INDEX: 36 KG/M2 | WEIGHT: 237.5 LBS

## 2023-04-06 DIAGNOSIS — I11.0: ICD-10-CM

## 2023-04-06 DIAGNOSIS — F12.90: ICD-10-CM

## 2023-04-06 DIAGNOSIS — E11.9: ICD-10-CM

## 2023-04-06 DIAGNOSIS — Z86.73: ICD-10-CM

## 2023-04-06 DIAGNOSIS — Z79.899: ICD-10-CM

## 2023-04-06 DIAGNOSIS — I50.9: ICD-10-CM

## 2023-04-06 DIAGNOSIS — J44.9: ICD-10-CM

## 2023-04-06 DIAGNOSIS — Z98.890: ICD-10-CM

## 2023-04-06 DIAGNOSIS — G89.29: ICD-10-CM

## 2023-04-06 DIAGNOSIS — F15.90: ICD-10-CM

## 2023-04-06 DIAGNOSIS — I25.10: ICD-10-CM

## 2023-04-06 DIAGNOSIS — J06.9: Primary | ICD-10-CM

## 2023-04-06 LAB
ALBUMIN SERPL BCP-MCNC: 3.6 G/DL (ref 3.4–5)
ALBUMIN/GLOB SERPL: 0.9 {RATIO} (ref 1.1–1.5)
ALP SERPL-CCNC: 151 IU/L (ref 46–116)
ALT SERPL W P-5'-P-CCNC: 82 U/L (ref 12–78)
ANION GAP SERPL CALCULATED.3IONS-SCNC: 13 MMOL/L (ref 8–16)
AST SERPL W P-5'-P-CCNC: 63 U/L (ref 10–37)
BASOPHILS # BLD AUTO: 0.1 X10'3 (ref 0–0.2)
BASOPHILS NFR BLD AUTO: 0.9 % (ref 0–1)
BILIRUB SERPL-MCNC: 0.4 MG/DL (ref 0.1–1)
BUN SERPL-MCNC: 21 MG/DL (ref 7–18)
BUN/CREAT SERPL: 20.6 (ref 10–20)
CALCIUM SERPL-MCNC: 9.4 MG/DL (ref 8.5–10.1)
CHLORIDE SERPL-SCNC: 102 MMOL/L (ref 99–107)
CO2 SERPL-SCNC: 21.8 MMOL/L (ref 24–32)
CREAT SERPL-MCNC: 1.02 MG/DL (ref 0.4–0.9)
D DIMER PPP FEU-MCNC: 0.21 MG/L FEU (ref 0–0.5)
EOSINOPHIL # BLD AUTO: 0.1 X10'3 (ref 0–0.9)
EOSINOPHIL NFR BLD AUTO: 1.9 % (ref 0–6)
ERYTHROCYTE [DISTWIDTH] IN BLOOD BY AUTOMATED COUNT: 14.3 % (ref 11.5–14.5)
GFR SERPL CREATININE-BSD FRML MDRD: 55 ML/MIN
GLUCOSE SERPL-MCNC: 240 MG/DL (ref 70–104)
HCT VFR BLD AUTO: 43.7 % (ref 35–45)
HGB BLD-MCNC: 14.4 G/DL (ref 12–16)
LYMPHOCYTES # BLD AUTO: 2.5 X10'3 (ref 1.1–4.8)
LYMPHOCYTES NFR BLD AUTO: 34.9 % (ref 21–51)
MAGNESIUM SERPL-MCNC: 2.1 MG/DL (ref 1.5–2.4)
MCH RBC QN AUTO: 28.8 PG (ref 27–31)
MCHC RBC AUTO-ENTMCNC: 32.9 G/DL (ref 33–36.5)
MCV RBC AUTO: 87.4 FL (ref 78–98)
MONOCYTES # BLD AUTO: 0.4 X10'3 (ref 0–0.9)
MONOCYTES NFR BLD AUTO: 6 % (ref 2–12)
NEUTROPHILS # BLD AUTO: 4.1 X10'3 (ref 1.8–7.7)
NEUTROPHILS NFR BLD AUTO: 56.3 % (ref 42–75)
PLATELET # BLD AUTO: 182 X10'3 (ref 140–440)
PMV BLD AUTO: 9.1 FL (ref 7.4–10.4)
POTASSIUM SERPL-SCNC: 3.9 MMOL/L (ref 3.5–5.1)
PROT SERPL-MCNC: 7.7 G/DL (ref 6.4–8.2)
RBC # BLD AUTO: 5 X10'6 (ref 4.2–5.6)
SODIUM SERPL-SCNC: 137 MMOL/L (ref 135–145)
WBC # BLD AUTO: 7.2 X10'3 (ref 4.5–11)

## 2023-04-06 PROCEDURE — 36415 COLL VENOUS BLD VENIPUNCTURE: CPT

## 2023-04-06 PROCEDURE — 83880 ASSAY OF NATRIURETIC PEPTIDE: CPT

## 2023-04-06 PROCEDURE — 85025 COMPLETE CBC W/AUTO DIFF WBC: CPT

## 2023-04-06 PROCEDURE — 83735 ASSAY OF MAGNESIUM: CPT

## 2023-04-06 PROCEDURE — 80053 COMPREHEN METABOLIC PANEL: CPT

## 2023-04-06 PROCEDURE — 85379 FIBRIN DEGRADATION QUANT: CPT

## 2023-04-06 PROCEDURE — 71045 X-RAY EXAM CHEST 1 VIEW: CPT

## 2023-04-06 PROCEDURE — 99285 EMERGENCY DEPT VISIT HI MDM: CPT

## 2023-04-06 PROCEDURE — 93005 ELECTROCARDIOGRAM TRACING: CPT

## 2023-04-06 PROCEDURE — 84484 ASSAY OF TROPONIN QUANT: CPT

## 2023-04-07 VITALS — SYSTOLIC BLOOD PRESSURE: 156 MMHG | DIASTOLIC BLOOD PRESSURE: 107 MMHG

## 2024-01-25 ENCOUNTER — HOSPITAL ENCOUNTER (EMERGENCY)
Dept: HOSPITAL 94 - ER | Age: 59
LOS: 1 days | Discharge: LEFT BEFORE DISCHARGE | End: 2024-01-26
Payer: MEDICAID

## 2024-01-25 VITALS — HEIGHT: 68 IN | BODY MASS INDEX: 33.08 KG/M2 | WEIGHT: 218.26 LBS

## 2024-01-25 VITALS
OXYGEN SATURATION: 100 % | RESPIRATION RATE: 18 BRPM | DIASTOLIC BLOOD PRESSURE: 105 MMHG | HEART RATE: 100 BPM | TEMPERATURE: 97.8 F | SYSTOLIC BLOOD PRESSURE: 170 MMHG

## 2024-01-25 DIAGNOSIS — M79.10: Primary | ICD-10-CM

## 2024-01-25 DIAGNOSIS — Z53.21: ICD-10-CM

## 2024-01-25 PROCEDURE — 99281 EMR DPT VST MAYX REQ PHY/QHP: CPT

## 2024-03-03 ENCOUNTER — HOSPITAL ENCOUNTER (EMERGENCY)
Dept: HOSPITAL 94 - ER | Age: 59
Discharge: HOME | End: 2024-03-03
Payer: MEDICAID

## 2024-03-03 VITALS
HEART RATE: 93 BPM | RESPIRATION RATE: 18 BRPM | SYSTOLIC BLOOD PRESSURE: 139 MMHG | DIASTOLIC BLOOD PRESSURE: 84 MMHG | OXYGEN SATURATION: 98 %

## 2024-03-03 VITALS — BODY MASS INDEX: 33.56 KG/M2 | WEIGHT: 221.45 LBS | HEIGHT: 68 IN

## 2024-03-03 VITALS — TEMPERATURE: 98.5 F

## 2024-03-03 DIAGNOSIS — Z79.899: ICD-10-CM

## 2024-03-03 DIAGNOSIS — Z79.2: ICD-10-CM

## 2024-03-03 DIAGNOSIS — Z98.61: ICD-10-CM

## 2024-03-03 DIAGNOSIS — Z88.8: ICD-10-CM

## 2024-03-03 DIAGNOSIS — Z86.73: ICD-10-CM

## 2024-03-03 DIAGNOSIS — N10: Primary | ICD-10-CM

## 2024-03-03 DIAGNOSIS — Z79.82: ICD-10-CM

## 2024-03-03 DIAGNOSIS — K08.89: ICD-10-CM

## 2024-03-03 DIAGNOSIS — J44.9: ICD-10-CM

## 2024-03-03 DIAGNOSIS — I25.2: ICD-10-CM

## 2024-03-03 DIAGNOSIS — I25.10: ICD-10-CM

## 2024-03-03 DIAGNOSIS — I50.9: ICD-10-CM

## 2024-03-03 DIAGNOSIS — Z88.5: ICD-10-CM

## 2024-03-03 DIAGNOSIS — E11.9: ICD-10-CM

## 2024-03-03 DIAGNOSIS — Z98.890: ICD-10-CM

## 2024-03-03 DIAGNOSIS — I11.0: ICD-10-CM

## 2024-03-03 DIAGNOSIS — Z88.0: ICD-10-CM

## 2024-03-03 LAB
ALBUMIN SERPL BCP-MCNC: 3.4 G/DL (ref 3.4–5)
ANION GAP SERPL CALCULATED.3IONS-SCNC: 12 MMOL/L (ref 8–16)
BACTERIA URNS QL MICRO: (no result) /HPF
BASOPHILS # BLD AUTO: 0 X10'3 (ref 0–0.2)
BASOPHILS NFR BLD AUTO: 0.5 % (ref 0–1)
BILIRUB UR QL STRIP: NEGATIVE
BUN SERPL-MCNC: 13 MG/DL (ref 7–18)
BUN/CREAT SERPL: 14.6 (ref 10–20)
CALCIUM SERPL-MCNC: 9 MG/DL (ref 8.5–10.1)
CHLORIDE SERPL-SCNC: 104 MMOL/L (ref 99–107)
CLARITY UR: (no result)
CO2 SERPL-SCNC: 23.7 MMOL/L (ref 24–32)
COLOR UR: YELLOW
CREAT CL PREDICTED SERPL C-G-VRATE: 67 ML/MIN
CREAT SERPL-MCNC: 0.89 MG/DL (ref 0.4–0.9)
DEPRECATED SQUAMOUS URNS QL MICRO: (no result) /LPF
EOSINOPHIL # BLD AUTO: 0.2 X10'3 (ref 0–0.9)
EOSINOPHIL NFR BLD AUTO: 3.1 % (ref 0–6)
ERYTHROCYTE [DISTWIDTH] IN BLOOD BY AUTOMATED COUNT: 14.5 % (ref 11.5–14.5)
GFR SERPL CREATININE-BSD FRML MDRD: 64 ML/MIN
GLUCOSE SERPL-MCNC: 319 MG/DL (ref 70–104)
GLUCOSE UR STRIP-MCNC: >=1000 MG/DL
HCG UR QL: NEGATIVE
HCT VFR BLD AUTO: 42 % (ref 35–45)
HGB BLD-MCNC: 13.7 G/DL (ref 12–16)
HGB UR QL STRIP: NEGATIVE
KETONES UR STRIP-MCNC: NEGATIVE MG/DL
LEUKOCYTE ESTERASE UR QL STRIP: NEGATIVE
LIPASE SERPL-CCNC: 46 U/L (ref 16–77)
LYMPHOCYTES # BLD AUTO: 2.7 X10'3 (ref 1.1–4.8)
LYMPHOCYTES NFR BLD AUTO: 45.5 % (ref 21–51)
MCH RBC QN AUTO: 28.2 PG (ref 27–31)
MCHC RBC AUTO-ENTMCNC: 32.6 G/DL (ref 33–36.5)
MCV RBC AUTO: 86.7 FL (ref 78–98)
MONOCYTES # BLD AUTO: 0.4 X10'3 (ref 0–0.9)
MONOCYTES NFR BLD AUTO: 7.2 % (ref 2–12)
NEUTROPHILS # BLD AUTO: 2.6 X10'3 (ref 1.8–7.7)
NEUTROPHILS NFR BLD AUTO: 43.7 % (ref 42–75)
NITRITE UR QL STRIP: POSITIVE
PH UR STRIP: 6 [PH] (ref 4.8–8)
PLATELET # BLD AUTO: 160 X10'3 (ref 140–440)
PMV BLD AUTO: 9.5 FL (ref 7.4–10.4)
POTASSIUM SERPL-SCNC: 3.9 MMOL/L (ref 3.5–5.1)
PROT UR QL STRIP: NEGATIVE MG/DL
RBC # BLD AUTO: 4.84 X10'6 (ref 4.2–5.6)
RBC #/AREA URNS HPF: (no result) /HPF (ref 0–2)
SODIUM SERPL-SCNC: 140 MMOL/L (ref 135–145)
SP GR UR STRIP: 1.01 (ref 1–1.03)
URN COLLECT METHOD CLASS: (no result)
UROBILINOGEN UR STRIP-MCNC: 0.2 E.U/DL (ref 0.2–1)
WBC # BLD AUTO: 5.9 X10'3 (ref 4.5–11)
YEAST URNS QL MICRO: (no result) /HPF

## 2024-03-03 PROCEDURE — 96372 THER/PROPH/DIAG INJ SC/IM: CPT

## 2024-03-03 PROCEDURE — 81001 URINALYSIS AUTO W/SCOPE: CPT

## 2024-03-03 PROCEDURE — 87186 SC STD MICRODIL/AGAR DIL: CPT

## 2024-03-03 PROCEDURE — 84484 ASSAY OF TROPONIN QUANT: CPT

## 2024-03-03 PROCEDURE — 36415 COLL VENOUS BLD VENIPUNCTURE: CPT

## 2024-03-03 PROCEDURE — 85025 COMPLETE CBC W/AUTO DIFF WBC: CPT

## 2024-03-03 PROCEDURE — 99285 EMERGENCY DEPT VISIT HI MDM: CPT

## 2024-03-03 PROCEDURE — 96361 HYDRATE IV INFUSION ADD-ON: CPT

## 2024-03-03 PROCEDURE — 81025 URINE PREGNANCY TEST: CPT

## 2024-03-03 PROCEDURE — 74176 CT ABD & PELVIS W/O CONTRAST: CPT

## 2024-03-03 PROCEDURE — 83690 ASSAY OF LIPASE: CPT

## 2024-03-03 PROCEDURE — 80048 BASIC METABOLIC PNL TOTAL CA: CPT

## 2024-03-03 PROCEDURE — 87088 URINE BACTERIA CULTURE: CPT

## 2024-03-03 PROCEDURE — 96365 THER/PROPH/DIAG IV INF INIT: CPT

## 2024-03-03 PROCEDURE — 87077 CULTURE AEROBIC IDENTIFY: CPT

## 2024-03-03 PROCEDURE — 96375 TX/PRO/DX INJ NEW DRUG ADDON: CPT

## 2024-03-03 RX ADMIN — SODIUM CHLORIDE ONE ML: 9 INJECTION INTRAMUSCULAR; INTRAVENOUS; SUBCUTANEOUS at 04:10

## 2024-03-03 RX ADMIN — CEFTRIAXONE ONE MLS/HR: 2 INJECTION, SOLUTION INTRAVENOUS at 10:00

## 2024-03-03 RX ADMIN — INSULIN LISPRO SCH UNITS: 100 INJECTION, SOLUTION INTRAVENOUS; SUBCUTANEOUS at 07:03

## 2024-03-03 RX ADMIN — ONDANSETRON ONE MG: 2 INJECTION, SOLUTION INTRAMUSCULAR; INTRAVENOUS at 05:18

## 2024-03-03 RX ADMIN — MORPHINE SULFATE PRN MG: 4 INJECTION, SOLUTION INTRAMUSCULAR; INTRAVENOUS at 05:34

## 2024-04-06 ENCOUNTER — HOSPITAL ENCOUNTER (INPATIENT)
Dept: HOSPITAL 94 - ER | Age: 59
LOS: 3 days | Discharge: HOME HEALTH | DRG: 720 | End: 2024-04-09
Attending: FAMILY MEDICINE | Admitting: HOSPITALIST
Payer: MEDICAID

## 2024-04-06 VITALS — HEIGHT: 68 IN | BODY MASS INDEX: 33.41 KG/M2 | WEIGHT: 220.46 LBS

## 2024-04-06 DIAGNOSIS — I11.0: ICD-10-CM

## 2024-04-06 DIAGNOSIS — I25.2: ICD-10-CM

## 2024-04-06 DIAGNOSIS — Z86.73: ICD-10-CM

## 2024-04-06 DIAGNOSIS — G89.29: ICD-10-CM

## 2024-04-06 DIAGNOSIS — A41.9: Primary | ICD-10-CM

## 2024-04-06 DIAGNOSIS — I50.9: ICD-10-CM

## 2024-04-06 DIAGNOSIS — Z79.82: ICD-10-CM

## 2024-04-06 DIAGNOSIS — Z88.1: ICD-10-CM

## 2024-04-06 DIAGNOSIS — Z79.4: ICD-10-CM

## 2024-04-06 DIAGNOSIS — N17.0: ICD-10-CM

## 2024-04-06 DIAGNOSIS — N12: ICD-10-CM

## 2024-04-06 DIAGNOSIS — E11.65: ICD-10-CM

## 2024-04-06 DIAGNOSIS — I16.0: ICD-10-CM

## 2024-04-06 DIAGNOSIS — Z95.5: ICD-10-CM

## 2024-04-06 DIAGNOSIS — Z88.0: ICD-10-CM

## 2024-04-06 DIAGNOSIS — Z88.5: ICD-10-CM

## 2024-04-06 DIAGNOSIS — I25.10: ICD-10-CM

## 2024-04-06 DIAGNOSIS — Z91.018: ICD-10-CM

## 2024-04-06 DIAGNOSIS — Z79.84: ICD-10-CM

## 2024-04-06 DIAGNOSIS — J44.9: ICD-10-CM

## 2024-04-06 DIAGNOSIS — Z79.899: ICD-10-CM

## 2024-04-06 DIAGNOSIS — M54.9: ICD-10-CM

## 2024-04-06 LAB
ALBUMIN SERPL BCP-MCNC: 3.6 G/DL (ref 3.4–5)
ALBUMIN/GLOB SERPL: 0.7 {RATIO} (ref 1.1–1.5)
ALP SERPL-CCNC: 277 IU/L (ref 46–116)
ALT SERPL W P-5'-P-CCNC: 57 U/L (ref 12–78)
ANION GAP SERPL CALCULATED.3IONS-SCNC: 17 MMOL/L (ref 8–16)
AST SERPL W P-5'-P-CCNC: 44 U/L (ref 10–37)
BACTERIA URNS QL MICRO: (no result) /HPF
BASOPHILS # BLD AUTO: 0.1 X10'3 (ref 0–0.2)
BASOPHILS NFR BLD AUTO: 0.7 % (ref 0–1)
BILIRUB SERPL-MCNC: 1.1 MG/DL (ref 0.1–1)
BILIRUB UR QL STRIP: NEGATIVE
BUN SERPL-MCNC: 18 MG/DL (ref 7–18)
BUN/CREAT SERPL: 18.6 (ref 10–20)
CALCIUM SERPL-MCNC: 10.2 MG/DL (ref 8.5–10.1)
CHLORIDE SERPL-SCNC: 91 MMOL/L (ref 99–107)
CLARITY UR: (no result)
CO2 SERPL-SCNC: 18.2 MMOL/L (ref 24–32)
COLOR UR: YELLOW
CREAT CL PREDICTED SERPL C-G-VRATE: 61 ML/MIN
CREAT SERPL-MCNC: 0.97 MG/DL (ref 0.4–0.9)
DEPRECATED SQUAMOUS URNS QL MICRO: (no result) /LPF
EOSINOPHIL # BLD AUTO: 0.1 X10'3 (ref 0–0.9)
EOSINOPHIL NFR BLD AUTO: 1 % (ref 0–6)
ERYTHROCYTE [DISTWIDTH] IN BLOOD BY AUTOMATED COUNT: 13.8 % (ref 11.5–14.5)
GFR SERPL CREATININE-BSD FRML MDRD: 58 ML/MIN
GLOBULIN SER CALC-MCNC: 5.2 G/DL (ref 2.7–4.3)
GLUCOSE SERPL-MCNC: 480 MG/DL (ref 70–104)
GLUCOSE UR STRIP-MCNC: >=1000 MG/DL
HCT VFR BLD AUTO: 48.4 % (ref 35–45)
HGB BLD-MCNC: 16.4 G/DL (ref 12–16)
HGB UR QL STRIP: (no result)
KETONES UR STRIP-MCNC: 15 MG/DL
LEUKOCYTE ESTERASE UR QL STRIP: (no result)
LYMPHOCYTES # BLD AUTO: 2.4 X10'3 (ref 1.1–4.8)
LYMPHOCYTES NFR BLD AUTO: 18.4 % (ref 21–51)
MCH RBC QN AUTO: 28.8 PG (ref 27–31)
MCHC RBC AUTO-ENTMCNC: 33.8 G/DL (ref 33–36.5)
MCV RBC AUTO: 85.1 FL (ref 78–98)
MONOCYTES # BLD AUTO: 1 X10'3 (ref 0–0.9)
MONOCYTES NFR BLD AUTO: 7.4 % (ref 2–12)
MUCOUS THREADS URNS QL MICRO: (no result) /LPF
NEUTROPHILS # BLD AUTO: 9.5 X10'3 (ref 1.8–7.7)
NEUTROPHILS NFR BLD AUTO: 72.5 % (ref 42–75)
NITRITE UR QL STRIP: POSITIVE
PH UR STRIP: 6 [PH] (ref 4.8–8)
PLATELET # BLD AUTO: 231 X10'3 (ref 140–440)
PMV BLD AUTO: 8.8 FL (ref 7.4–10.4)
POTASSIUM SERPL-SCNC: 4.4 MMOL/L (ref 3.5–5.1)
PROT SERPL-MCNC: 8.8 G/DL (ref 6.4–8.2)
PROT UR QL STRIP: 30 MG/DL
RBC # BLD AUTO: 5.69 X10'6 (ref 4.2–5.6)
RBC #/AREA URNS HPF: (no result) /HPF (ref 0–2)
SODIUM SERPL-SCNC: 126 MMOL/L (ref 135–145)
SP GR UR STRIP: 1.01 (ref 1–1.03)
URN COLLECT METHOD CLASS: (no result)
UROBILINOGEN UR STRIP-MCNC: 0.2 E.U/DL (ref 0.2–1)
WBC # BLD AUTO: 13 X10'3 (ref 4.5–11)
WBC #/AREA URNS HPF: (no result) /HPF (ref 0–4)
WBC CLUMPS #/AREA URNS HPF: (no result) /HPF

## 2024-04-06 PROCEDURE — 99285 EMERGENCY DEPT VISIT HI MDM: CPT

## 2024-04-06 PROCEDURE — 85025 COMPLETE CBC W/AUTO DIFF WBC: CPT

## 2024-04-06 PROCEDURE — 80048 BASIC METABOLIC PNL TOTAL CA: CPT

## 2024-04-06 PROCEDURE — 74176 CT ABD & PELVIS W/O CONTRAST: CPT

## 2024-04-06 PROCEDURE — 84145 PROCALCITONIN (PCT): CPT

## 2024-04-06 PROCEDURE — 93005 ELECTROCARDIOGRAM TRACING: CPT

## 2024-04-06 PROCEDURE — 87081 CULTURE SCREEN ONLY: CPT

## 2024-04-06 PROCEDURE — 94760 N-INVAS EAR/PLS OXIMETRY 1: CPT

## 2024-04-06 PROCEDURE — 86704 HEP B CORE ANTIBODY TOTAL: CPT

## 2024-04-06 PROCEDURE — 96375 TX/PRO/DX INJ NEW DRUG ADDON: CPT

## 2024-04-06 PROCEDURE — 87077 CULTURE AEROBIC IDENTIFY: CPT

## 2024-04-06 PROCEDURE — 83036 HEMOGLOBIN GLYCOSYLATED A1C: CPT

## 2024-04-06 PROCEDURE — 71045 X-RAY EXAM CHEST 1 VIEW: CPT

## 2024-04-06 PROCEDURE — 96374 THER/PROPH/DIAG INJ IV PUSH: CPT

## 2024-04-06 PROCEDURE — 82948 REAGENT STRIP/BLOOD GLUCOSE: CPT

## 2024-04-06 PROCEDURE — 87340 HEPATITIS B SURFACE AG IA: CPT

## 2024-04-06 PROCEDURE — 87186 SC STD MICRODIL/AGAR DIL: CPT

## 2024-04-06 PROCEDURE — 36415 COLL VENOUS BLD VENIPUNCTURE: CPT

## 2024-04-06 PROCEDURE — 83605 ASSAY OF LACTIC ACID: CPT

## 2024-04-06 PROCEDURE — 87040 BLOOD CULTURE FOR BACTERIA: CPT

## 2024-04-06 PROCEDURE — 87088 URINE BACTERIA CULTURE: CPT

## 2024-04-06 PROCEDURE — 86705 HEP B CORE ANTIBODY IGM: CPT

## 2024-04-06 PROCEDURE — 80053 COMPREHEN METABOLIC PANEL: CPT

## 2024-04-06 PROCEDURE — 81001 URINALYSIS AUTO W/SCOPE: CPT

## 2024-04-06 RX ADMIN — ACETAMINOPHEN STA MLS/HR: 10 INJECTION, SOLUTION INTRAVENOUS at 22:31

## 2024-04-06 RX ADMIN — SODIUM CHLORIDE ONE MLS/HR: 9 INJECTION INTRAMUSCULAR; INTRAVENOUS; SUBCUTANEOUS at 23:26

## 2024-04-06 RX ADMIN — SODIUM CHLORIDE STA ML: 9 INJECTION INTRAMUSCULAR; INTRAVENOUS; SUBCUTANEOUS at 22:25

## 2024-04-06 RX ADMIN — ONDANSETRON ONE MG: 2 INJECTION, SOLUTION INTRAMUSCULAR; INTRAVENOUS at 22:28

## 2024-04-07 VITALS
SYSTOLIC BLOOD PRESSURE: 128 MMHG | DIASTOLIC BLOOD PRESSURE: 75 MMHG | RESPIRATION RATE: 16 BRPM | TEMPERATURE: 98.4 F | HEART RATE: 98 BPM | OXYGEN SATURATION: 100 %

## 2024-04-07 VITALS
DIASTOLIC BLOOD PRESSURE: 58 MMHG | TEMPERATURE: 98.4 F | SYSTOLIC BLOOD PRESSURE: 106 MMHG | RESPIRATION RATE: 24 BRPM | HEART RATE: 89 BPM | OXYGEN SATURATION: 96 %

## 2024-04-07 VITALS
SYSTOLIC BLOOD PRESSURE: 131 MMHG | HEART RATE: 99 BPM | RESPIRATION RATE: 24 BRPM | OXYGEN SATURATION: 95 % | TEMPERATURE: 99.5 F | DIASTOLIC BLOOD PRESSURE: 57 MMHG

## 2024-04-07 VITALS — OXYGEN SATURATION: 100 % | RESPIRATION RATE: 16 BRPM

## 2024-04-07 VITALS — OXYGEN SATURATION: 96 % | RESPIRATION RATE: 24 BRPM

## 2024-04-07 LAB
ALBUMIN SERPL BCP-MCNC: 2.6 G/DL (ref 3.4–5)
ALBUMIN/GLOB SERPL: 0.7 {RATIO} (ref 1.1–1.5)
ALP SERPL-CCNC: 245 IU/L (ref 46–116)
ALT SERPL W P-5'-P-CCNC: 63 U/L (ref 12–78)
ANION GAP SERPL CALCULATED.3IONS-SCNC: 12 MMOL/L (ref 8–16)
AST SERPL W P-5'-P-CCNC: 48 U/L (ref 10–37)
BASOPHILS # BLD AUTO: 0 X10'3 (ref 0–0.2)
BASOPHILS NFR BLD AUTO: 0.4 % (ref 0–1)
BILIRUB SERPL-MCNC: 0.8 MG/DL (ref 0.1–1)
BUN SERPL-MCNC: 20 MG/DL (ref 7–18)
BUN/CREAT SERPL: 19 (ref 10–20)
CALCIUM SERPL-MCNC: 8.1 MG/DL (ref 8.5–10.1)
CHLORIDE SERPL-SCNC: 96 MMOL/L (ref 99–107)
CO2 SERPL-SCNC: 22.9 MMOL/L (ref 24–32)
CREAT CL PREDICTED SERPL C-G-VRATE: 57 ML/MIN
CREAT SERPL-MCNC: 1.05 MG/DL (ref 0.4–0.9)
EOSINOPHIL # BLD AUTO: 0 X10'3 (ref 0–0.9)
EOSINOPHIL NFR BLD AUTO: 0.4 % (ref 0–6)
ERYTHROCYTE [DISTWIDTH] IN BLOOD BY AUTOMATED COUNT: 14.1 % (ref 11.5–14.5)
GFR SERPL CREATININE-BSD FRML MDRD: 53 ML/MIN
GLOBULIN SER CALC-MCNC: 4 G/DL (ref 2.7–4.3)
GLUCOSE SERPL-MCNC: 474 MG/DL (ref 70–104)
HCT VFR BLD AUTO: 42.1 % (ref 35–45)
HGB BLD-MCNC: 14.2 G/DL (ref 12–16)
LYMPHOCYTES # BLD AUTO: 1.6 X10'3 (ref 1.1–4.8)
LYMPHOCYTES NFR BLD AUTO: 14.7 % (ref 21–51)
MCH RBC QN AUTO: 28.9 PG (ref 27–31)
MCHC RBC AUTO-ENTMCNC: 33.7 G/DL (ref 33–36.5)
MCV RBC AUTO: 85.7 FL (ref 78–98)
MONOCYTES # BLD AUTO: 0.8 X10'3 (ref 0–0.9)
MONOCYTES NFR BLD AUTO: 7.8 % (ref 2–12)
NEUTROPHILS # BLD AUTO: 8.2 X10'3 (ref 1.8–7.7)
NEUTROPHILS NFR BLD AUTO: 76.7 % (ref 42–75)
PLATELET # BLD AUTO: 167 X10'3 (ref 140–440)
PMV BLD AUTO: 8.7 FL (ref 7.4–10.4)
POTASSIUM SERPL-SCNC: 4.4 MMOL/L (ref 3.5–5.1)
PROT SERPL-MCNC: 6.6 G/DL (ref 6.4–8.2)
RBC # BLD AUTO: 4.91 X10'6 (ref 4.2–5.6)
SODIUM SERPL-SCNC: 131 MMOL/L (ref 135–145)
WBC # BLD AUTO: 10.6 X10'3 (ref 4.5–11)

## 2024-04-07 RX ADMIN — DOCUSATE SODIUM SCH MG: 100 CAPSULE, LIQUID FILLED ORAL at 08:00

## 2024-04-07 RX ADMIN — CEFTRIAXONE SCH MLS/HR: 1 INJECTION, SOLUTION INTRAVENOUS at 07:53

## 2024-04-07 RX ADMIN — SODIUM CHLORIDE SCH MLS/HR: 9 INJECTION INTRAMUSCULAR; INTRAVENOUS; SUBCUTANEOUS at 01:50

## 2024-04-07 RX ADMIN — INSULIN LISPRO SCH UNITS: 100 INJECTION, SOLUTION INTRAVENOUS; SUBCUTANEOUS at 10:41

## 2024-04-07 RX ADMIN — Medication ONE: at 01:33

## 2024-04-07 RX ADMIN — INSULIN GLARGINE SCH UNIT: 100 INJECTION, SOLUTION SUBCUTANEOUS at 23:51

## 2024-04-07 RX ADMIN — MEROPENEM AND SODIUM CHLORIDE SCH MLS/HR: 1 INJECTION, SOLUTION INTRAVENOUS at 01:51

## 2024-04-08 VITALS
DIASTOLIC BLOOD PRESSURE: 72 MMHG | TEMPERATURE: 98.1 F | OXYGEN SATURATION: 95 % | RESPIRATION RATE: 18 BRPM | SYSTOLIC BLOOD PRESSURE: 129 MMHG | HEART RATE: 86 BPM

## 2024-04-08 VITALS
SYSTOLIC BLOOD PRESSURE: 160 MMHG | HEART RATE: 95 BPM | RESPIRATION RATE: 16 BRPM | TEMPERATURE: 98.6 F | OXYGEN SATURATION: 98 % | DIASTOLIC BLOOD PRESSURE: 80 MMHG

## 2024-04-08 VITALS
OXYGEN SATURATION: 96 % | SYSTOLIC BLOOD PRESSURE: 135 MMHG | DIASTOLIC BLOOD PRESSURE: 70 MMHG | HEART RATE: 85 BPM | TEMPERATURE: 98.1 F | RESPIRATION RATE: 16 BRPM

## 2024-04-08 VITALS
OXYGEN SATURATION: 95 % | TEMPERATURE: 99.3 F | HEART RATE: 90 BPM | RESPIRATION RATE: 24 BRPM | DIASTOLIC BLOOD PRESSURE: 73 MMHG | SYSTOLIC BLOOD PRESSURE: 142 MMHG

## 2024-04-08 VITALS — OXYGEN SATURATION: 96 % | RESPIRATION RATE: 16 BRPM

## 2024-04-08 VITALS — OXYGEN SATURATION: 96 % | HEART RATE: 88 BPM | RESPIRATION RATE: 16 BRPM

## 2024-04-08 VITALS — OXYGEN SATURATION: 98 % | RESPIRATION RATE: 18 BRPM

## 2024-04-08 LAB
ALBUMIN SERPL BCP-MCNC: 2.4 G/DL (ref 3.4–5)
ANION GAP SERPL CALCULATED.3IONS-SCNC: 8 MMOL/L (ref 8–16)
BASOPHILS # BLD AUTO: 0 X10'3 (ref 0–0.2)
BASOPHILS NFR BLD AUTO: 0.5 % (ref 0–1)
BUN SERPL-MCNC: 14 MG/DL (ref 7–18)
BUN/CREAT SERPL: 22.2 (ref 10–20)
CALCIUM SERPL-MCNC: 8.1 MG/DL (ref 8.5–10.1)
CHLORIDE SERPL-SCNC: 102 MMOL/L (ref 99–107)
CO2 SERPL-SCNC: 21.7 MMOL/L (ref 24–32)
CREAT CL PREDICTED SERPL C-G-VRATE: 95 ML/MIN
CREAT SERPL-MCNC: 0.63 MG/DL (ref 0.4–0.9)
EOSINOPHIL # BLD AUTO: 0.1 X10'3 (ref 0–0.9)
EOSINOPHIL NFR BLD AUTO: 1.8 % (ref 0–6)
ERYTHROCYTE [DISTWIDTH] IN BLOOD BY AUTOMATED COUNT: 13.9 % (ref 11.5–14.5)
GFR SERPL CREATININE-BSD FRML MDRD: > 90 ML/MIN
GLUCOSE SERPL-MCNC: 285 MG/DL (ref 70–104)
HCT VFR BLD AUTO: 40.1 % (ref 35–45)
HGB BLD-MCNC: 13.3 G/DL (ref 12–16)
LYMPHOCYTES # BLD AUTO: 2.3 X10'3 (ref 1.1–4.8)
LYMPHOCYTES NFR BLD AUTO: 29.8 % (ref 21–51)
MCH RBC QN AUTO: 28.9 PG (ref 27–31)
MCHC RBC AUTO-ENTMCNC: 33.2 G/DL (ref 33–36.5)
MCV RBC AUTO: 87 FL (ref 78–98)
MONOCYTES # BLD AUTO: 0.6 X10'3 (ref 0–0.9)
MONOCYTES NFR BLD AUTO: 7.9 % (ref 2–12)
NEUTROPHILS # BLD AUTO: 4.6 X10'3 (ref 1.8–7.7)
NEUTROPHILS NFR BLD AUTO: 60 % (ref 42–75)
PLATELET # BLD AUTO: 132 X10'3 (ref 140–440)
PMV BLD AUTO: 8.9 FL (ref 7.4–10.4)
POTASSIUM SERPL-SCNC: 4 MMOL/L (ref 3.5–5.1)
RBC # BLD AUTO: 4.61 X10'6 (ref 4.2–5.6)
SODIUM SERPL-SCNC: 132 MMOL/L (ref 135–145)
WBC # BLD AUTO: 7.7 X10'3 (ref 4.5–11)

## 2024-04-08 RX ADMIN — IPRATROPIUM BROMIDE SCH MG: 0.5 SOLUTION RESPIRATORY (INHALATION) at 20:49

## 2024-04-08 RX ADMIN — POLYETHYLENE GLYCOL 3350 SCH GM: 17 POWDER, FOR SOLUTION ORAL at 11:45

## 2024-04-09 VITALS
DIASTOLIC BLOOD PRESSURE: 90 MMHG | SYSTOLIC BLOOD PRESSURE: 158 MMHG | TEMPERATURE: 99.1 F | OXYGEN SATURATION: 94 % | RESPIRATION RATE: 14 BRPM | HEART RATE: 95 BPM

## 2024-04-09 VITALS
HEART RATE: 83 BPM | SYSTOLIC BLOOD PRESSURE: 167 MMHG | RESPIRATION RATE: 18 BRPM | TEMPERATURE: 99 F | OXYGEN SATURATION: 93 % | DIASTOLIC BLOOD PRESSURE: 87 MMHG

## 2024-04-09 VITALS — OXYGEN SATURATION: 94 % | RESPIRATION RATE: 14 BRPM

## 2024-04-09 LAB
ALBUMIN SERPL BCP-MCNC: 2.3 G/DL (ref 3.4–5)
ANION GAP SERPL CALCULATED.3IONS-SCNC: 6 MMOL/L (ref 8–16)
BASOPHILS # BLD AUTO: 0 X10'3 (ref 0–0.2)
BASOPHILS NFR BLD AUTO: 0.6 % (ref 0–1)
BUN SERPL-MCNC: 11 MG/DL (ref 7–18)
BUN/CREAT SERPL: 17.2 (ref 10–20)
CALCIUM SERPL-MCNC: 8 MG/DL (ref 8.5–10.1)
CHLORIDE SERPL-SCNC: 101 MMOL/L (ref 99–107)
CO2 SERPL-SCNC: 23.3 MMOL/L (ref 24–32)
CREAT CL PREDICTED SERPL C-G-VRATE: 93 ML/MIN
CREAT SERPL-MCNC: 0.64 MG/DL (ref 0.4–0.9)
EOSINOPHIL # BLD AUTO: 0.2 X10'3 (ref 0–0.9)
EOSINOPHIL NFR BLD AUTO: 2.3 % (ref 0–6)
ERYTHROCYTE [DISTWIDTH] IN BLOOD BY AUTOMATED COUNT: 14 % (ref 11.5–14.5)
GFR SERPL CREATININE-BSD FRML MDRD: > 90 ML/MIN
GLUCOSE SERPL-MCNC: 250 MG/DL (ref 70–104)
HCT VFR BLD AUTO: 39.8 % (ref 35–45)
HGB BLD-MCNC: 13.5 G/DL (ref 12–16)
LYMPHOCYTES # BLD AUTO: 1.8 X10'3 (ref 1.1–4.8)
LYMPHOCYTES NFR BLD AUTO: 26.2 % (ref 21–51)
MCH RBC QN AUTO: 29 PG (ref 27–31)
MCHC RBC AUTO-ENTMCNC: 33.8 G/DL (ref 33–36.5)
MCV RBC AUTO: 85.8 FL (ref 78–98)
MONOCYTES # BLD AUTO: 0.6 X10'3 (ref 0–0.9)
MONOCYTES NFR BLD AUTO: 8.9 % (ref 2–12)
NEUTROPHILS # BLD AUTO: 4.3 X10'3 (ref 1.8–7.7)
NEUTROPHILS NFR BLD AUTO: 62 % (ref 42–75)
PLATELET # BLD AUTO: 153 X10'3 (ref 140–440)
PMV BLD AUTO: 8.8 FL (ref 7.4–10.4)
POTASSIUM SERPL-SCNC: 3.9 MMOL/L (ref 3.5–5.1)
RBC # BLD AUTO: 4.64 X10'6 (ref 4.2–5.6)
SODIUM SERPL-SCNC: 130 MMOL/L (ref 135–145)
WBC # BLD AUTO: 7 X10'3 (ref 4.5–11)

## 2024-04-09 RX ADMIN — MAGNESIUM HYDROXIDE PRN ML: 400 SUSPENSION ORAL at 08:15

## 2024-04-09 RX ADMIN — FLUTICASONE PROPIONATE SCH SPR: 50 SPRAY, METERED NASAL at 08:03

## 2024-07-05 ENCOUNTER — HOSPITAL ENCOUNTER (OUTPATIENT)
Dept: HOSPITAL 94 - RAD | Age: 59
Discharge: HOME | End: 2024-07-05
Attending: STUDENT IN AN ORGANIZED HEALTH CARE EDUCATION/TRAINING PROGRAM
Payer: MEDICAID

## 2024-07-05 DIAGNOSIS — D39.0: ICD-10-CM

## 2024-07-05 DIAGNOSIS — N95.0: ICD-10-CM

## 2024-07-05 DIAGNOSIS — D25.9: Primary | ICD-10-CM

## 2024-07-05 PROCEDURE — 93976 VASCULAR STUDY: CPT

## 2024-07-05 PROCEDURE — 76856 US EXAM PELVIC COMPLETE: CPT

## 2024-07-05 PROCEDURE — 76830 TRANSVAGINAL US NON-OB: CPT

## 2024-07-18 ENCOUNTER — HOSPITAL ENCOUNTER (OUTPATIENT)
Dept: HOSPITAL 94 - RAD | Age: 59
Discharge: HOME | End: 2024-07-18
Attending: STUDENT IN AN ORGANIZED HEALTH CARE EDUCATION/TRAINING PROGRAM
Payer: MEDICAID

## 2024-07-18 DIAGNOSIS — Z18.10: Primary | ICD-10-CM

## 2024-07-18 PROCEDURE — 73502 X-RAY EXAM HIP UNI 2-3 VIEWS: CPT
